# Patient Record
Sex: MALE | Race: WHITE | NOT HISPANIC OR LATINO
[De-identification: names, ages, dates, MRNs, and addresses within clinical notes are randomized per-mention and may not be internally consistent; named-entity substitution may affect disease eponyms.]

---

## 2017-05-08 ENCOUNTER — CHARTING TRANS (OUTPATIENT)
Dept: OTHER | Age: 72
End: 2017-05-08

## 2018-11-03 VITALS
HEART RATE: 82 BPM | HEIGHT: 76 IN | WEIGHT: 315 LBS | SYSTOLIC BLOOD PRESSURE: 140 MMHG | RESPIRATION RATE: 20 BRPM | DIASTOLIC BLOOD PRESSURE: 76 MMHG | BODY MASS INDEX: 38.36 KG/M2

## 2020-07-17 ENCOUNTER — LAB ENCOUNTER (OUTPATIENT)
Dept: LAB | Facility: HOSPITAL | Age: 75
DRG: 469 | End: 2020-07-17
Attending: ORTHOPAEDIC SURGERY
Payer: MEDICARE

## 2020-07-17 ENCOUNTER — LAB ENCOUNTER (OUTPATIENT)
Dept: LAB | Facility: HOSPITAL | Age: 75
End: 2020-07-17
Attending: ORTHOPAEDIC SURGERY
Payer: MEDICARE

## 2020-07-17 DIAGNOSIS — Z01.818 PREOP TESTING: ICD-10-CM

## 2020-07-17 DIAGNOSIS — Z01.818 PRE-OP TESTING: ICD-10-CM

## 2020-07-17 LAB
ANTIBODY SCREEN: NEGATIVE
MRSA DNA SPEC QL NAA+PROBE: NEGATIVE
RH BLOOD TYPE: POSITIVE

## 2020-07-17 PROCEDURE — 86901 BLOOD TYPING SEROLOGIC RH(D): CPT

## 2020-07-17 PROCEDURE — 86850 RBC ANTIBODY SCREEN: CPT

## 2020-07-17 PROCEDURE — 36415 COLL VENOUS BLD VENIPUNCTURE: CPT

## 2020-07-17 PROCEDURE — 86900 BLOOD TYPING SEROLOGIC ABO: CPT

## 2020-07-17 PROCEDURE — 87641 MR-STAPH DNA AMP PROBE: CPT

## 2020-07-17 RX ORDER — HYDROCHLOROTHIAZIDE 12.5 MG/1
12.5 TABLET ORAL DAILY
COMMUNITY

## 2020-07-17 RX ORDER — ALLOPURINOL 300 MG/1
300 TABLET ORAL DAILY
COMMUNITY

## 2020-07-17 RX ORDER — ASPIRIN 81 MG/1
81 TABLET ORAL DAILY
Status: ON HOLD | COMMUNITY
End: 2020-07-22

## 2020-07-17 RX ORDER — LISINOPRIL 20 MG/1
20 TABLET ORAL DAILY
COMMUNITY

## 2020-07-17 RX ORDER — TAMSULOSIN HYDROCHLORIDE 0.4 MG/1
CAPSULE ORAL DAILY
COMMUNITY

## 2020-07-17 RX ORDER — METOPROLOL SUCCINATE 25 MG/1
25 TABLET, EXTENDED RELEASE ORAL DAILY
COMMUNITY

## 2020-07-17 RX ORDER — ATORVASTATIN CALCIUM 40 MG/1
40 TABLET, FILM COATED ORAL DAILY
COMMUNITY

## 2020-07-17 RX ORDER — PREGABALIN 150 MG/1
150 CAPSULE ORAL NIGHTLY
COMMUNITY

## 2020-07-17 RX ORDER — PIOGLITAZONEHYDROCHLORIDE 45 MG/1
45 TABLET ORAL DAILY
COMMUNITY

## 2020-07-18 LAB — SARS-COV-2 RNA RESP QL NAA+PROBE: NOT DETECTED

## 2020-07-20 ENCOUNTER — APPOINTMENT (OUTPATIENT)
Dept: GENERAL RADIOLOGY | Facility: HOSPITAL | Age: 75
DRG: 469 | End: 2020-07-20
Attending: NURSE PRACTITIONER
Payer: MEDICARE

## 2020-07-20 ENCOUNTER — ANESTHESIA (OUTPATIENT)
Dept: SURGERY | Facility: HOSPITAL | Age: 75
DRG: 469 | End: 2020-07-20
Payer: MEDICARE

## 2020-07-20 ENCOUNTER — HOSPITAL ENCOUNTER (INPATIENT)
Facility: HOSPITAL | Age: 75
LOS: 10 days | Discharge: NURSING, REHAB OR LONG-TERM CARE | DRG: 469 | End: 2020-07-30
Attending: ORTHOPAEDIC SURGERY | Admitting: ORTHOPAEDIC SURGERY
Payer: MEDICARE

## 2020-07-20 ENCOUNTER — ANESTHESIA EVENT (OUTPATIENT)
Dept: SURGERY | Facility: HOSPITAL | Age: 75
DRG: 469 | End: 2020-07-20
Payer: MEDICARE

## 2020-07-20 DIAGNOSIS — M16.11 DEGENERATIVE JOINT DISEASE OF RIGHT HIP: ICD-10-CM

## 2020-07-20 DIAGNOSIS — Z01.818 PREOP TESTING: Primary | ICD-10-CM

## 2020-07-20 DIAGNOSIS — M16.11 PRIMARY OSTEOARTHRITIS OF RIGHT HIP: ICD-10-CM

## 2020-07-20 PROBLEM — M10.9 GOUT: Chronic | Status: ACTIVE | Noted: 2020-07-20

## 2020-07-20 PROBLEM — E66.01 MORBID OBESITY WITH BMI OF 40.0-44.9, ADULT (HCC): Chronic | Status: ACTIVE | Noted: 2020-07-20

## 2020-07-20 PROBLEM — E78.5 HYPERLIPIDEMIA: Chronic | Status: ACTIVE | Noted: 2020-07-20

## 2020-07-20 PROBLEM — I10 ESSENTIAL HYPERTENSION: Chronic | Status: ACTIVE | Noted: 2020-07-20

## 2020-07-20 PROBLEM — E11.9 DIABETES MELLITUS, TYPE 2 (HCC): Chronic | Status: ACTIVE | Noted: 2020-07-20

## 2020-07-20 PROBLEM — Z96.641 S/P HIP REPLACEMENT, RIGHT: Status: ACTIVE | Noted: 2020-07-20

## 2020-07-20 LAB
DEPRECATED RDW RBC AUTO: 54.7 FL (ref 35.1–46.3)
ERYTHROCYTE [DISTWIDTH] IN BLOOD BY AUTOMATED COUNT: 15 % (ref 11–15)
EST. AVERAGE GLUCOSE BLD GHB EST-MCNC: 111 MG/DL (ref 68–126)
GLUCOSE BLDC GLUCOMTR-MCNC: 101 MG/DL (ref 70–99)
GLUCOSE BLDC GLUCOMTR-MCNC: 124 MG/DL (ref 70–99)
GLUCOSE BLDC GLUCOMTR-MCNC: 125 MG/DL (ref 70–99)
GLUCOSE BLDC GLUCOMTR-MCNC: 131 MG/DL (ref 70–99)
HBA1C MFR BLD HPLC: 5.5 % (ref ?–5.7)
HCT VFR BLD AUTO: 30.6 % (ref 39–53)
HGB BLD-MCNC: 9.9 G/DL (ref 13–17.5)
MCH RBC QN AUTO: 32 PG (ref 26–34)
MCHC RBC AUTO-ENTMCNC: 32.4 G/DL (ref 31–37)
MCV RBC AUTO: 99 FL (ref 80–100)
PLATELET # BLD AUTO: 138 10(3)UL (ref 150–450)
RBC # BLD AUTO: 3.09 X10(6)UL (ref 3.8–5.8)
WBC # BLD AUTO: 10.4 X10(3) UL (ref 4–11)

## 2020-07-20 PROCEDURE — 99232 SBSQ HOSP IP/OBS MODERATE 35: CPT | Performed by: HOSPITALIST

## 2020-07-20 PROCEDURE — 72170 X-RAY EXAM OF PELVIS: CPT | Performed by: NURSE PRACTITIONER

## 2020-07-20 PROCEDURE — P9045 ALBUMIN (HUMAN), 5%, 250 ML: HCPCS | Performed by: NURSE ANESTHETIST, CERTIFIED REGISTERED

## 2020-07-20 PROCEDURE — 0SR904Z REPLACEMENT OF RIGHT HIP JOINT WITH CERAMIC ON POLYETHYLENE SYNTHETIC SUBSTITUTE, OPEN APPROACH: ICD-10-PCS | Performed by: ORTHOPAEDIC SURGERY

## 2020-07-20 DEVICE — PINNACLE CANCELLOUS BONE SCREW 6.5MM X 30MM
Type: IMPLANTABLE DEVICE | Site: HIP | Status: FUNCTIONAL
Brand: PINNACLE

## 2020-07-20 DEVICE — PINNACLE CANCELLOUS BONE SCREW 6.5MM X 35MM
Type: IMPLANTABLE DEVICE | Site: HIP | Status: FUNCTIONAL
Brand: PINNACLE

## 2020-07-20 DEVICE — CERAMIC HEAD UPCHARGE: Type: IMPLANTABLE DEVICE | Status: FUNCTIONAL

## 2020-07-20 DEVICE — SUMMIT FEMORAL STEM 12/14 TAPER TAPER ED W/POROCOAT SIZE 9 STD 165MM
Type: IMPLANTABLE DEVICE | Site: HIP | Status: FUNCTIONAL
Brand: SUMMIT POROCOAT

## 2020-07-20 DEVICE — PINNACLE HIP SOLUTIONS ALTRX POLYETHYLENE ACETABULAR LINER NEUTRAL 36MM ID 62MM OD
Type: IMPLANTABLE DEVICE | Site: HIP | Status: FUNCTIONAL
Brand: PINNACLE ALTRX

## 2020-07-20 DEVICE — TOTAL HIP W/GRP CUP & COCR HD: Type: IMPLANTABLE DEVICE | Status: FUNCTIONAL

## 2020-07-20 DEVICE — PINNACLE GRIPTION ACETABULAR SHELL MULTI-HOLE 62MM OD
Type: IMPLANTABLE DEVICE | Site: HIP | Status: FUNCTIONAL
Brand: PINNACLE GRIPTION

## 2020-07-20 DEVICE — BIOLOX DELTA CERAMIC FEMORAL HEAD +8.5 36MM DIA 12/14 TAPER
Type: IMPLANTABLE DEVICE | Site: HIP | Status: FUNCTIONAL
Brand: BIOLOX DELTA

## 2020-07-20 RX ORDER — MORPHINE SULFATE 2 MG/ML
1 INJECTION, SOLUTION INTRAMUSCULAR; INTRAVENOUS EVERY 2 HOUR PRN
Status: DISCONTINUED | OUTPATIENT
Start: 2020-07-20 | End: 2020-07-30

## 2020-07-20 RX ORDER — LIDOCAINE HYDROCHLORIDE 10 MG/ML
INJECTION, SOLUTION EPIDURAL; INFILTRATION; INTRACAUDAL; PERINEURAL AS NEEDED
Status: DISCONTINUED | OUTPATIENT
Start: 2020-07-20 | End: 2020-07-20 | Stop reason: SURG

## 2020-07-20 RX ORDER — HYDROCHLOROTHIAZIDE 25 MG/1
12.5 TABLET ORAL DAILY
Status: DISCONTINUED | OUTPATIENT
Start: 2020-07-21 | End: 2020-07-22

## 2020-07-20 RX ORDER — HYDROMORPHONE HYDROCHLORIDE 1 MG/ML
0.6 INJECTION, SOLUTION INTRAMUSCULAR; INTRAVENOUS; SUBCUTANEOUS EVERY 5 MIN PRN
Status: DISCONTINUED | OUTPATIENT
Start: 2020-07-20 | End: 2020-07-20 | Stop reason: HOSPADM

## 2020-07-20 RX ORDER — CYCLOBENZAPRINE HCL 10 MG
10 TABLET ORAL EVERY 8 HOURS PRN
Status: DISCONTINUED | OUTPATIENT
Start: 2020-07-20 | End: 2020-07-30

## 2020-07-20 RX ORDER — POLYETHYLENE GLYCOL 3350 17 G/17G
17 POWDER, FOR SOLUTION ORAL DAILY PRN
Status: DISCONTINUED | OUTPATIENT
Start: 2020-07-20 | End: 2020-07-22

## 2020-07-20 RX ORDER — ONDANSETRON 2 MG/ML
INJECTION INTRAMUSCULAR; INTRAVENOUS AS NEEDED
Status: DISCONTINUED | OUTPATIENT
Start: 2020-07-20 | End: 2020-07-20 | Stop reason: SURG

## 2020-07-20 RX ORDER — DOCUSATE SODIUM 100 MG/1
100 CAPSULE, LIQUID FILLED ORAL 2 TIMES DAILY
Status: DISCONTINUED | OUTPATIENT
Start: 2020-07-20 | End: 2020-07-30

## 2020-07-20 RX ORDER — ACETAMINOPHEN 325 MG/1
650 TABLET ORAL EVERY 4 HOURS PRN
Status: DISCONTINUED | OUTPATIENT
Start: 2020-07-20 | End: 2020-07-30

## 2020-07-20 RX ORDER — METOCLOPRAMIDE 10 MG/1
10 TABLET ORAL ONCE
Status: COMPLETED | OUTPATIENT
Start: 2020-07-20 | End: 2020-07-20

## 2020-07-20 RX ORDER — MORPHINE SULFATE 4 MG/ML
4 INJECTION, SOLUTION INTRAMUSCULAR; INTRAVENOUS EVERY 10 MIN PRN
Status: DISCONTINUED | OUTPATIENT
Start: 2020-07-20 | End: 2020-07-20 | Stop reason: HOSPADM

## 2020-07-20 RX ORDER — METOPROLOL SUCCINATE 25 MG/1
25 TABLET, EXTENDED RELEASE ORAL DAILY
Status: DISCONTINUED | OUTPATIENT
Start: 2020-07-21 | End: 2020-07-30

## 2020-07-20 RX ORDER — SENNOSIDES 8.6 MG
17.2 TABLET ORAL NIGHTLY
Status: DISCONTINUED | OUTPATIENT
Start: 2020-07-20 | End: 2020-07-30

## 2020-07-20 RX ORDER — PREGABALIN 75 MG/1
150 CAPSULE ORAL DAILY
Status: DISCONTINUED | OUTPATIENT
Start: 2020-07-21 | End: 2020-07-20

## 2020-07-20 RX ORDER — HYDROCODONE BITARTRATE AND ACETAMINOPHEN 10; 325 MG/1; MG/1
1 TABLET ORAL EVERY 4 HOURS PRN
Status: DISCONTINUED | OUTPATIENT
Start: 2020-07-20 | End: 2020-07-30

## 2020-07-20 RX ORDER — FAMOTIDINE 10 MG/ML
20 INJECTION, SOLUTION INTRAVENOUS 2 TIMES DAILY
Status: DISCONTINUED | OUTPATIENT
Start: 2020-07-20 | End: 2020-07-22

## 2020-07-20 RX ORDER — HYDROCODONE BITARTRATE AND ACETAMINOPHEN 5; 325 MG/1; MG/1
2 TABLET ORAL AS NEEDED
Status: DISCONTINUED | OUTPATIENT
Start: 2020-07-20 | End: 2020-07-20 | Stop reason: HOSPADM

## 2020-07-20 RX ORDER — SCOLOPAMINE TRANSDERMAL SYSTEM 1 MG/1
1 PATCH, EXTENDED RELEASE TRANSDERMAL ONCE
Status: COMPLETED | OUTPATIENT
Start: 2020-07-20 | End: 2020-07-23

## 2020-07-20 RX ORDER — PROCHLORPERAZINE EDISYLATE 5 MG/ML
10 INJECTION INTRAMUSCULAR; INTRAVENOUS EVERY 6 HOURS PRN
Status: ACTIVE | OUTPATIENT
Start: 2020-07-20 | End: 2020-07-22

## 2020-07-20 RX ORDER — CELECOXIB 200 MG/1
200 CAPSULE ORAL ONCE
Status: COMPLETED | OUTPATIENT
Start: 2020-07-20 | End: 2020-07-20

## 2020-07-20 RX ORDER — SODIUM PHOSPHATE, DIBASIC AND SODIUM PHOSPHATE, MONOBASIC 7; 19 G/133ML; G/133ML
1 ENEMA RECTAL ONCE AS NEEDED
Status: DISCONTINUED | OUTPATIENT
Start: 2020-07-20 | End: 2020-07-30

## 2020-07-20 RX ORDER — PIOGLITAZONEHYDROCHLORIDE 45 MG/1
45 TABLET ORAL DAILY
Status: DISCONTINUED | OUTPATIENT
Start: 2020-07-21 | End: 2020-07-30

## 2020-07-20 RX ORDER — PREGABALIN 75 MG/1
150 CAPSULE ORAL EVERY EVENING
Status: DISCONTINUED | OUTPATIENT
Start: 2020-07-20 | End: 2020-07-30

## 2020-07-20 RX ORDER — METOPROLOL TARTRATE 5 MG/5ML
2.5 INJECTION INTRAVENOUS ONCE
Status: DISCONTINUED | OUTPATIENT
Start: 2020-07-20 | End: 2020-07-20 | Stop reason: HOSPADM

## 2020-07-20 RX ORDER — TRANEXAMIC ACID 10 MG/ML
1000 INJECTION, SOLUTION INTRAVENOUS ONCE
Status: COMPLETED | OUTPATIENT
Start: 2020-07-20 | End: 2020-07-20

## 2020-07-20 RX ORDER — DEXTROSE MONOHYDRATE 25 G/50ML
50 INJECTION, SOLUTION INTRAVENOUS
Status: DISCONTINUED | OUTPATIENT
Start: 2020-07-20 | End: 2020-07-20 | Stop reason: HOSPADM

## 2020-07-20 RX ORDER — ALLOPURINOL 300 MG/1
300 TABLET ORAL DAILY
Status: DISCONTINUED | OUTPATIENT
Start: 2020-07-21 | End: 2020-07-30

## 2020-07-20 RX ORDER — NALOXONE HYDROCHLORIDE 0.4 MG/ML
80 INJECTION, SOLUTION INTRAMUSCULAR; INTRAVENOUS; SUBCUTANEOUS AS NEEDED
Status: DISCONTINUED | OUTPATIENT
Start: 2020-07-20 | End: 2020-07-20 | Stop reason: HOSPADM

## 2020-07-20 RX ORDER — BISACODYL 10 MG
10 SUPPOSITORY, RECTAL RECTAL
Status: DISCONTINUED | OUTPATIENT
Start: 2020-07-20 | End: 2020-07-30

## 2020-07-20 RX ORDER — MORPHINE SULFATE 10 MG/ML
6 INJECTION, SOLUTION INTRAMUSCULAR; INTRAVENOUS EVERY 10 MIN PRN
Status: DISCONTINUED | OUTPATIENT
Start: 2020-07-20 | End: 2020-07-20 | Stop reason: HOSPADM

## 2020-07-20 RX ORDER — HYDROCODONE BITARTRATE AND ACETAMINOPHEN 5; 325 MG/1; MG/1
1 TABLET ORAL AS NEEDED
Status: DISCONTINUED | OUTPATIENT
Start: 2020-07-20 | End: 2020-07-20 | Stop reason: HOSPADM

## 2020-07-20 RX ORDER — SODIUM CHLORIDE, SODIUM LACTATE, POTASSIUM CHLORIDE, CALCIUM CHLORIDE 600; 310; 30; 20 MG/100ML; MG/100ML; MG/100ML; MG/100ML
INJECTION, SOLUTION INTRAVENOUS CONTINUOUS
Status: DISCONTINUED | OUTPATIENT
Start: 2020-07-20 | End: 2020-07-20 | Stop reason: HOSPADM

## 2020-07-20 RX ORDER — ALBUMIN, HUMAN INJ 5% 5 %
SOLUTION INTRAVENOUS CONTINUOUS PRN
Status: DISCONTINUED | OUTPATIENT
Start: 2020-07-20 | End: 2020-07-20 | Stop reason: SURG

## 2020-07-20 RX ORDER — MORPHINE SULFATE 4 MG/ML
4 INJECTION, SOLUTION INTRAMUSCULAR; INTRAVENOUS EVERY 2 HOUR PRN
Status: DISCONTINUED | OUTPATIENT
Start: 2020-07-20 | End: 2020-07-30

## 2020-07-20 RX ORDER — ACETAMINOPHEN 500 MG
1000 TABLET ORAL ONCE
Status: COMPLETED | OUTPATIENT
Start: 2020-07-20 | End: 2020-07-20

## 2020-07-20 RX ORDER — FAMOTIDINE 20 MG/1
20 TABLET ORAL ONCE
Status: COMPLETED | OUTPATIENT
Start: 2020-07-20 | End: 2020-07-20

## 2020-07-20 RX ORDER — KETOROLAC TROMETHAMINE 15 MG/ML
15 INJECTION, SOLUTION INTRAMUSCULAR; INTRAVENOUS EVERY 6 HOURS
Status: DISCONTINUED | OUTPATIENT
Start: 2020-07-20 | End: 2020-07-20 | Stop reason: HOSPADM

## 2020-07-20 RX ORDER — ONDANSETRON 2 MG/ML
4 INJECTION INTRAMUSCULAR; INTRAVENOUS ONCE AS NEEDED
Status: DISCONTINUED | OUTPATIENT
Start: 2020-07-20 | End: 2020-07-20 | Stop reason: HOSPADM

## 2020-07-20 RX ORDER — SODIUM CHLORIDE 9 MG/ML
INJECTION, SOLUTION INTRAVENOUS CONTINUOUS
Status: DISCONTINUED | OUTPATIENT
Start: 2020-07-20 | End: 2020-07-24

## 2020-07-20 RX ORDER — HALOPERIDOL 5 MG/ML
0.25 INJECTION INTRAMUSCULAR ONCE AS NEEDED
Status: DISCONTINUED | OUTPATIENT
Start: 2020-07-20 | End: 2020-07-20 | Stop reason: HOSPADM

## 2020-07-20 RX ORDER — BUPIVACAINE HYDROCHLORIDE AND EPINEPHRINE 2.5; 5 MG/ML; UG/ML
INJECTION, SOLUTION INFILTRATION; PERINEURAL AS NEEDED
Status: DISCONTINUED | OUTPATIENT
Start: 2020-07-20 | End: 2020-07-20 | Stop reason: HOSPADM

## 2020-07-20 RX ORDER — HYDROCODONE BITARTRATE AND ACETAMINOPHEN 10; 325 MG/1; MG/1
2 TABLET ORAL EVERY 4 HOURS PRN
Status: DISCONTINUED | OUTPATIENT
Start: 2020-07-20 | End: 2020-07-30

## 2020-07-20 RX ORDER — GLYCOPYRROLATE 0.2 MG/ML
INJECTION, SOLUTION INTRAMUSCULAR; INTRAVENOUS AS NEEDED
Status: DISCONTINUED | OUTPATIENT
Start: 2020-07-20 | End: 2020-07-20 | Stop reason: SURG

## 2020-07-20 RX ORDER — PROCHLORPERAZINE EDISYLATE 5 MG/ML
5 INJECTION INTRAMUSCULAR; INTRAVENOUS ONCE AS NEEDED
Status: DISCONTINUED | OUTPATIENT
Start: 2020-07-20 | End: 2020-07-20 | Stop reason: HOSPADM

## 2020-07-20 RX ORDER — HYDROCODONE BITARTRATE AND ACETAMINOPHEN 5; 325 MG/1; MG/1
1 TABLET ORAL EVERY 4 HOURS PRN
Status: DISCONTINUED | OUTPATIENT
Start: 2020-07-20 | End: 2020-07-30

## 2020-07-20 RX ORDER — HYDROMORPHONE HYDROCHLORIDE 1 MG/ML
0.2 INJECTION, SOLUTION INTRAMUSCULAR; INTRAVENOUS; SUBCUTANEOUS EVERY 5 MIN PRN
Status: DISCONTINUED | OUTPATIENT
Start: 2020-07-20 | End: 2020-07-20 | Stop reason: HOSPADM

## 2020-07-20 RX ORDER — OXYCODONE HCL 10 MG/1
10 TABLET, FILM COATED, EXTENDED RELEASE ORAL ONCE
Status: COMPLETED | OUTPATIENT
Start: 2020-07-20 | End: 2020-07-20

## 2020-07-20 RX ORDER — HYDROMORPHONE HYDROCHLORIDE 1 MG/ML
0.4 INJECTION, SOLUTION INTRAMUSCULAR; INTRAVENOUS; SUBCUTANEOUS EVERY 5 MIN PRN
Status: DISCONTINUED | OUTPATIENT
Start: 2020-07-20 | End: 2020-07-20 | Stop reason: HOSPADM

## 2020-07-20 RX ORDER — LISINOPRIL 20 MG/1
20 TABLET ORAL DAILY
Status: DISCONTINUED | OUTPATIENT
Start: 2020-07-21 | End: 2020-07-22

## 2020-07-20 RX ORDER — ONDANSETRON 2 MG/ML
4 INJECTION INTRAMUSCULAR; INTRAVENOUS EVERY 4 HOURS PRN
Status: DISCONTINUED | OUTPATIENT
Start: 2020-07-20 | End: 2020-07-30

## 2020-07-20 RX ORDER — MORPHINE SULFATE 2 MG/ML
2 INJECTION, SOLUTION INTRAMUSCULAR; INTRAVENOUS EVERY 2 HOUR PRN
Status: DISCONTINUED | OUTPATIENT
Start: 2020-07-20 | End: 2020-07-30

## 2020-07-20 RX ORDER — FAMOTIDINE 20 MG/1
20 TABLET ORAL 2 TIMES DAILY
Status: DISCONTINUED | OUTPATIENT
Start: 2020-07-20 | End: 2020-07-22

## 2020-07-20 RX ORDER — ATORVASTATIN CALCIUM 40 MG/1
40 TABLET, FILM COATED ORAL NIGHTLY
Status: DISCONTINUED | OUTPATIENT
Start: 2020-07-20 | End: 2020-07-30

## 2020-07-20 RX ORDER — TAMSULOSIN HYDROCHLORIDE 0.4 MG/1
0.4 CAPSULE ORAL DAILY
Status: DISCONTINUED | OUTPATIENT
Start: 2020-07-21 | End: 2020-07-30

## 2020-07-20 RX ORDER — SODIUM CHLORIDE, SODIUM LACTATE, POTASSIUM CHLORIDE, CALCIUM CHLORIDE 600; 310; 30; 20 MG/100ML; MG/100ML; MG/100ML; MG/100ML
INJECTION, SOLUTION INTRAVENOUS CONTINUOUS
Status: DISCONTINUED | OUTPATIENT
Start: 2020-07-20 | End: 2020-07-23

## 2020-07-20 RX ORDER — MORPHINE SULFATE 4 MG/ML
2 INJECTION, SOLUTION INTRAMUSCULAR; INTRAVENOUS EVERY 10 MIN PRN
Status: DISCONTINUED | OUTPATIENT
Start: 2020-07-20 | End: 2020-07-20 | Stop reason: HOSPADM

## 2020-07-20 RX ORDER — DEXTROSE MONOHYDRATE 25 G/50ML
50 INJECTION, SOLUTION INTRAVENOUS
Status: DISCONTINUED | OUTPATIENT
Start: 2020-07-20 | End: 2020-07-30

## 2020-07-20 RX ORDER — METOCLOPRAMIDE HYDROCHLORIDE 5 MG/ML
10 INJECTION INTRAMUSCULAR; INTRAVENOUS EVERY 6 HOURS PRN
Status: ACTIVE | OUTPATIENT
Start: 2020-07-20 | End: 2020-07-22

## 2020-07-20 RX ADMIN — ONDANSETRON 4 MG: 2 INJECTION INTRAMUSCULAR; INTRAVENOUS at 12:15:00

## 2020-07-20 RX ADMIN — GLYCOPYRROLATE 0.2 MG: 0.2 INJECTION, SOLUTION INTRAMUSCULAR; INTRAVENOUS at 13:00:00

## 2020-07-20 RX ADMIN — SODIUM CHLORIDE, SODIUM LACTATE, POTASSIUM CHLORIDE, CALCIUM CHLORIDE: 600; 310; 30; 20 INJECTION, SOLUTION INTRAVENOUS at 14:23:00

## 2020-07-20 RX ADMIN — ALBUMIN, HUMAN INJ 5%: 5 SOLUTION INTRAVENOUS at 13:48:00

## 2020-07-20 RX ADMIN — LIDOCAINE HYDROCHLORIDE 50 MG: 10 INJECTION, SOLUTION EPIDURAL; INFILTRATION; INTRACAUDAL; PERINEURAL at 12:15:00

## 2020-07-20 RX ADMIN — TRANEXAMIC ACID 1000 MG: 10 INJECTION, SOLUTION INTRAVENOUS at 12:39:00

## 2020-07-20 RX ADMIN — SODIUM CHLORIDE, SODIUM LACTATE, POTASSIUM CHLORIDE, CALCIUM CHLORIDE: 600; 310; 30; 20 INJECTION, SOLUTION INTRAVENOUS at 13:49:00

## 2020-07-20 NOTE — ANESTHESIA PROCEDURE NOTES
Airway  Date/Time: 7/20/2020 12:33 PM  Urgency: Elective    Airway not difficult    General Information and Staff    Patient location during procedure: OR  Anesthesiologist: Nora Grajeda MD  Resident/CRNA: My Novak CRNA  Performed: Allison Becerril

## 2020-07-20 NOTE — ANESTHESIA POSTPROCEDURE EVALUATION
Patient: Nonnie Handing    Procedure Summary     Date:  07/20/20 Room / Location:  [facility] OR [address] [place] [address] OR    Anesthesia Start:  6714 Anesthesia Stop:  9333    Procedure:  HIP TOTAL REPLACEMENT (Right Hip) Diagnosis:  (degenerative joint di

## 2020-07-20 NOTE — ANESTHESIA PREPROCEDURE EVALUATION
Anesthesia PreOp Note    HPI:     David Chavez is a 69year old male who presents for preoperative consultation requested by: Treasure Nevarez MD    Date of Surgery: 7/20/2020    Procedure(s):  HIP TOTAL REPLACEMENT  Indication: degenerative j 0600  pregabalin 150 MG Oral Cap, Take 150 mg by mouth daily. , Disp: , Rfl: , 7/20/2020 at 0600  tamsulosin (FLOMAX) cap, Take by mouth daily. , Disp: , Rfl: , Past Month at Unknown time  Oxaprozin (DAYPRO OR), Take 1 tablet by mouth daily. , Disp: , Rfl: , file    Occupational History      Not on file    Social Needs      Financial resource strain: Not on file      Food insecurity:        Worry: Not on file        Inability: Not on file      Transportation needs:        Medical: Not on file        Non-medica amputee, uses wheel chair, medical clearance in the chart   Abdominal   (+) obese,              Anesthesia Plan:   ASA:  3  Plan:   General  Airway:  ETT  Plan Comments: Due to previous back surgeries discussed general with the patient.  Also discussed the

## 2020-07-20 NOTE — PROGRESS NOTES
[facility]OSP - [facility]    Progress Note    Denny Abraham Patient Status:  Inpatient    10/20/1945 MRN [id number]   Location [address] UNIT Attending Vlad Harvey MD   Hosp Day # 0 PCP Karla Rocha MD Morbid obesity with BMI of 40.0-44.9, adult ([facility])  MONITOR RESPIRATORY AND HEMODYNAMIC STATUS, POSSIBLE UNDIAGNOSED LELO. Hyperlipidemia  CONT HOME MEDS. Gout  CONT HOME MEDS.        Diabetes mellitus, type 2 ([facility] [address]  CONT HOME MEDS, MONITOR ACCU

## 2020-07-20 NOTE — ANESTHESIA PROCEDURE NOTES
Peripheral IV  Date/Time: 7/20/2020 12:19 PM  Inserted by: Kory Chen CRNA    Placement  Needle size: 18 G  Laterality: right  Location: hand  Local anesthetic: none  Site prep: alcohol  Technique: anatomical landmarks  Attempts: 1

## 2020-07-20 NOTE — H&P
History & Physical Examination    Patient Name: Deisy Roman  MRN: [id number]  [facility]: [id number]  YOB: 1945    Diagnosis: right hip DJD    Present Illness: Mr. Gary Barr is a 70 YO male with a  Hx of DJD of the right hip who presen (REGLAN) tab 10 mg, 10 mg, Oral, Once  celecoxib (CeleBREX) cap 200 mg, 200 mg, Oral, Once  oxyCODONE HCl ER (OXYCONTIN) 12 hr tab 10 mg, 10 mg, Oral, Once  scopolamine (TRANSDERM-SCOP) patch, 1 patch, Transdermal, Once  tranexamic acid (CYKLOKAPRON) IVPB

## 2020-07-20 NOTE — OPERATIVE REPORT
[facility]    [place] Brief Operative Note    Nonnie Handing Patient Status:  Inpatient    10/20/1945 MRN [id number]   Location [facility] UNIT Attending Carmen Mena MD     PCP Nithya Sibley MD

## 2020-07-21 LAB
BASOPHILS # BLD AUTO: 0.02 X10(3) UL (ref 0–0.2)
BASOPHILS NFR BLD AUTO: 0.2 %
DEPRECATED RDW RBC AUTO: 54.7 FL (ref 35.1–46.3)
EOSINOPHIL # BLD AUTO: 0.05 X10(3) UL (ref 0–0.7)
EOSINOPHIL NFR BLD AUTO: 0.6 %
ERYTHROCYTE [DISTWIDTH] IN BLOOD BY AUTOMATED COUNT: 15.3 % (ref 11–15)
GLUCOSE BLDC GLUCOMTR-MCNC: 110 MG/DL (ref 70–99)
GLUCOSE BLDC GLUCOMTR-MCNC: 132 MG/DL (ref 70–99)
GLUCOSE BLDC GLUCOMTR-MCNC: 137 MG/DL (ref 70–99)
GLUCOSE BLDC GLUCOMTR-MCNC: 148 MG/DL (ref 70–99)
HCT VFR BLD AUTO: 27.3 % (ref 39–53)
HGB BLD-MCNC: 8.8 G/DL (ref 13–17.5)
IMM GRANULOCYTES # BLD AUTO: 0.03 X10(3) UL (ref 0–1)
IMM GRANULOCYTES NFR BLD: 0.4 %
LYMPHOCYTES # BLD AUTO: 1.82 X10(3) UL (ref 1–4)
LYMPHOCYTES NFR BLD AUTO: 21.3 %
MCH RBC QN AUTO: 31.9 PG (ref 26–34)
MCHC RBC AUTO-ENTMCNC: 32.2 G/DL (ref 31–37)
MCV RBC AUTO: 98.9 FL (ref 80–100)
MONOCYTES # BLD AUTO: 1.03 X10(3) UL (ref 0.1–1)
MONOCYTES NFR BLD AUTO: 12.1 %
NEUTROPHILS # BLD AUTO: 5.58 X10 (3) UL (ref 1.5–7.7)
NEUTROPHILS # BLD AUTO: 5.58 X10(3) UL (ref 1.5–7.7)
NEUTROPHILS NFR BLD AUTO: 65.4 %
PLATELET # BLD AUTO: 141 10(3)UL (ref 150–450)
RBC # BLD AUTO: 2.76 X10(6)UL (ref 3.8–5.8)
WBC # BLD AUTO: 8.5 X10(3) UL (ref 4–11)

## 2020-07-21 PROCEDURE — 99233 SBSQ HOSP IP/OBS HIGH 50: CPT | Performed by: HOSPITALIST

## 2020-07-21 RX ORDER — OXYCODONE HCL 20 MG/1
20 TABLET, FILM COATED, EXTENDED RELEASE ORAL EVERY 12 HOURS
Status: DISCONTINUED | OUTPATIENT
Start: 2020-07-21 | End: 2020-07-24

## 2020-07-21 RX ORDER — HYDROMORPHONE HYDROCHLORIDE 2 MG/1
2 TABLET ORAL
Status: DISCONTINUED | OUTPATIENT
Start: 2020-07-21 | End: 2020-07-30

## 2020-07-21 NOTE — CM/SW NOTE
7/29 350pm; MAUREEN received notification by Mazin Valle the pt's insurance has denied acute rehab. There is an option for a peer to peer from an Dolly Perdomo MD. MAUREEN spoke with Dr. Chanelle Mcdowell who is agreeable to the peer to peer.   RED to schedule the peer to peer for frances The pt's wife would like a referral to [facility]. Referral sent in Aidin. Will follow up on response. If they are able to accept that would be the pt. And his wife's first choice. [facility] does not accept the pt's insurance.   MAUREEN notif at [facility].  Referral made in [address]. The pt. And his wife are aware and agreeable to the above.         [phone]

## 2020-07-21 NOTE — RESPIRATORY THERAPY NOTE
Patient refused cpap therapy. [facility] has educated the patient on the purpose of and need for this therapy as well as potential negative outcomes associated with deferring treatment.  Despite education, patient maintains refusal. Pt & family states,he doesn't u

## 2020-07-21 NOTE — PLAN OF CARE
Pt states pain is slightly controlled with oral and iv pain meds. Pt remains max assist in all cares and is reinforced to call for all needs. Pt is drinking well and ospina is draining well. Pt current vss. Pt has fall precautions and ortho hip in place.  Rn

## 2020-07-21 NOTE — OPERATIVE REPORT
[address]  [address] [address] Hermes JAY    OPERATIVE REPORT    PATIENT NAME: Toan Perrin  MR#: [id number]    DATE OF PROCEDURE: 7/20/2020  PREOPERATIVE DIAGNOSIS: Degenerative Arthritis (e.g., OA) of the Right hip ability to walk. This has negatively affected his ability to perform the basic activities of daily living. Based upon his radiographs, that showed severe degenerative joint disease of the right hip, he was indicated for a right total hip arthroplasty.  We r dissected sharply down to the level of the deep fascia. The deep fascia was identified and incised in line with our incision and our Charnley retractor was placed deep to this layer.     We then identified the external rotators on the posterior aspect of th the medullary canal and a lateralizing reamer proximally.  We then successfully broached up to a size after using tapered reamers up to a size 9 and noted that we had excellent fit and fill proximally within the femur and good rotational and axial stability tolerated. He will work with physical therapy. He will be on deep venous thrombosis prophylaxis for the next three weeks and graciela-operative antibiotics for the next 24 hours. Based on medical history and extent of the surgery, the patient will be admitted t

## 2020-07-21 NOTE — OCCUPATIONAL THERAPY NOTE
OCCUPATIONAL THERAPY EVALUATION - INPATIENT      Room Number: 403/403-A  Evaluation Date: 7/21/2020  Type of Evaluation: Initial       Physician Order: IP Consult to Occupational Therapy  Reason for Therapy: ADL/IADL Dysfunction and Discharge Planning    O x2 to reposition to [facility]- patient left in bed with all needs in reach stable at exit.        DISCHARGE RECOMMENDATIONS:   OT Discharge Recommendations: Acute rehabilitation  OT Device Recommendations: TBD    PLAN          OCCUPATIONAL THERAPY MEDICAL/SOCIAL ASSESSMENT  Ratin       RANGE OF MOTION   Upper extremity ROM is 1/2-3/4 range at shoulders; strength is sufficient for basic ADL such as UE grooming, dressing, bathing, and self feeding, however will need to be addressed as it is not enough to provide

## 2020-07-21 NOTE — PHYSICAL THERAPY NOTE
PHYSICAL THERAPY HIP EVALUATION - INPATIENT     Room Number: 403/403-A  Evaluation Date: 7/21/2020  Type of Evaluation: Initial  Physician Order: PT Eval and Treat    Presenting Problem: R THR (L BKA X20 yrs ago)  Reason for Therapy: Mobility Dysfunction a raised. Pt able to clear bed a few inches to allow for linen change underneath him. Pt needing Max A of 2-3 for sit->supine and to boost up/reposition in bed. Pt unable to tolerate laying flat for more than a few seconds due to severe low back pain.  Yaris Laterality Date   • APPENDECTOMY     • HIP TOTAL REPLACEMENT Right 7/20/2020    Performed by Felicia Betts MD at [address] [place] [address]   • OTHER      BACK SURGERY X12   • OTHER Left     BELOW KNEE AMPUTEE   • SPINE SURGERY PROCEDURE UNLISTED      x 12 surgeries Lot   -   Sitting down on and standing up from a chair with arms (e.g., wheelchair, bedside commode, etc.): Unable   -   Moving from lying on back to sitting on the side of the bed?: A Lot   How much help from another person does the patient currently need Goal #6  Current Status

## 2020-07-21 NOTE — PROGRESS NOTES
[facility]    Progress Note    Klarissa Felipe Patient Status:  Inpatient    10/20/1945 MRN [id number]   Location [facility] 4W/SW/SE Attending José Miguel Calixto MD   Hosp Day # 1 PCP Arin Goodrich MD       Subjective:   R Xr Pelvis (1 View) (cpt=72170)    Result Date: 7/20/2020  CONCLUSION:  1. Status post right hip total arthroplasty.     Dictated by (CST): Cathy Palma MD on 7/20/2020 at 4:29 PM     Finalized by (CST): Franklin Jimenez MD on 7/20/20

## 2020-07-21 NOTE — PROGRESS NOTES
[facility]    Progress Note    Lety Delarosa Patient Status:  Inpatient    10/20/1945 MRN [id number]   Location [facility] 4W/SW/SE Attending Balaji Fletcher, [address] [facility] Day # 1 PCP Tesfaye Louis MD       Subjective: 3350 (MIRALAX) powder packet 17 g, 17 g, Oral, Daily PRN  magnesium hydroxide (MILK OF MAGNESIA) 400 MG/5ML suspension 30 mL, 30 mL, Oral, Daily PRN  bisacodyl (DULCOLAX) rectal suppository 10 mg, 10 mg, Rectal, Daily PRN  Fleet Enema (FLEET) 7-19 GM/118ML (cpt=72170)    Result Date: 7/20/2020  CONCLUSION:  1. Status post right hip total arthroplasty.     Dictated by (CST): Jerson Jimenez MD on 7/20/2020 at 4:29 PM     Finalized by (CST): Jerson Jimenez MD on 7/20/2020 at 4:30 PM

## 2020-07-22 ENCOUNTER — APPOINTMENT (OUTPATIENT)
Dept: ULTRASOUND IMAGING | Facility: HOSPITAL | Age: 75
DRG: 469 | End: 2020-07-22
Attending: HOSPITALIST
Payer: MEDICARE

## 2020-07-22 LAB
ANION GAP SERPL CALC-SCNC: 6 MMOL/L (ref 0–18)
ANION GAP SERPL CALC-SCNC: 6 MMOL/L (ref 0–18)
BASOPHILS # BLD AUTO: 0.02 X10(3) UL (ref 0–0.2)
BASOPHILS NFR BLD AUTO: 0.2 %
BILIRUB UR QL: NEGATIVE
BUN BLD-MCNC: 32 MG/DL (ref 7–18)
BUN BLD-MCNC: 39 MG/DL (ref 7–18)
BUN/CREAT SERPL: 13.4 (ref 10–20)
BUN/CREAT SERPL: 17.8 (ref 10–20)
CALCIUM BLD-MCNC: 7.5 MG/DL (ref 8.5–10.1)
CALCIUM BLD-MCNC: 7.6 MG/DL (ref 8.5–10.1)
CHLORIDE SERPL-SCNC: 107 MMOL/L (ref 98–112)
CHLORIDE SERPL-SCNC: 107 MMOL/L (ref 98–112)
CO2 SERPL-SCNC: 23 MMOL/L (ref 21–32)
CO2 SERPL-SCNC: 24 MMOL/L (ref 21–32)
COLOR UR: YELLOW
CREAT BLD-MCNC: 2.19 MG/DL (ref 0.7–1.3)
CREAT BLD-MCNC: 2.38 MG/DL (ref 0.7–1.3)
CREAT UR-SCNC: 172 MG/DL
CREAT UR-SCNC: 172 MG/DL
DEPRECATED RDW RBC AUTO: 54.4 FL (ref 35.1–46.3)
EOSINOPHIL # BLD AUTO: 0.13 X10(3) UL (ref 0–0.7)
EOSINOPHIL NFR BLD AUTO: 1.5 %
ERYTHROCYTE [DISTWIDTH] IN BLOOD BY AUTOMATED COUNT: 15.1 % (ref 11–15)
GLUCOSE BLD-MCNC: 112 MG/DL (ref 70–99)
GLUCOSE BLD-MCNC: 129 MG/DL (ref 70–99)
GLUCOSE BLDC GLUCOMTR-MCNC: 132 MG/DL (ref 70–99)
GLUCOSE BLDC GLUCOMTR-MCNC: 134 MG/DL (ref 70–99)
GLUCOSE BLDC GLUCOMTR-MCNC: 145 MG/DL (ref 70–99)
GLUCOSE UR-MCNC: NEGATIVE MG/DL
HCT VFR BLD AUTO: 23.8 % (ref 39–53)
HGB BLD-MCNC: 7.8 G/DL (ref 13–17.5)
HGB UR QL STRIP.AUTO: NEGATIVE
IMM GRANULOCYTES # BLD AUTO: 0.04 X10(3) UL (ref 0–1)
IMM GRANULOCYTES NFR BLD: 0.5 %
IRON SATURATION: 11 % (ref 20–50)
IRON SERPL-MCNC: 25 UG/DL (ref 65–175)
KETONES UR-MCNC: NEGATIVE MG/DL
LEUKOCYTE ESTERASE UR QL STRIP.AUTO: NEGATIVE
LYMPHOCYTES # BLD AUTO: 1.78 X10(3) UL (ref 1–4)
LYMPHOCYTES NFR BLD AUTO: 21.1 %
MCH RBC QN AUTO: 32.4 PG (ref 26–34)
MCHC RBC AUTO-ENTMCNC: 32.8 G/DL (ref 31–37)
MCV RBC AUTO: 98.8 FL (ref 80–100)
MICROALBUMIN UR-MCNC: 0.75 MG/DL
MICROALBUMIN/CREAT 24H UR-RTO: 4.4 UG/MG (ref ?–30)
MONOCYTES # BLD AUTO: 1.07 X10(3) UL (ref 0.1–1)
MONOCYTES NFR BLD AUTO: 12.7 %
NEUTROPHILS # BLD AUTO: 5.41 X10 (3) UL (ref 1.5–7.7)
NEUTROPHILS # BLD AUTO: 5.41 X10(3) UL (ref 1.5–7.7)
NEUTROPHILS NFR BLD AUTO: 64 %
NITRITE UR QL STRIP.AUTO: NEGATIVE
OSMOLALITY SERPL CALC.SUM OF ELEC: 292 MOSM/KG (ref 275–295)
OSMOLALITY SERPL CALC.SUM OF ELEC: 293 MOSM/KG (ref 275–295)
PH UR: 5 [PH] (ref 5–8)
PLATELET # BLD AUTO: 136 10(3)UL (ref 150–450)
POTASSIUM SERPL-SCNC: 4.1 MMOL/L (ref 3.5–5.1)
POTASSIUM SERPL-SCNC: 4.7 MMOL/L (ref 3.5–5.1)
PROT UR-MCNC: NEGATIVE MG/DL
RBC # BLD AUTO: 2.41 X10(6)UL (ref 3.8–5.8)
SODIUM SERPL-SCNC: 136 MMOL/L (ref 136–145)
SODIUM SERPL-SCNC: 137 MMOL/L (ref 136–145)
SODIUM SERPL-SCNC: 49 MMOL/L
SP GR UR STRIP: 1.02 (ref 1–1.03)
TOTAL IRON BINDING CAPACITY: 222 UG/DL (ref 240–450)
TRANSFERRIN SERPL-MCNC: 149 MG/DL (ref 200–360)
UROBILINOGEN UR STRIP-ACNC: <2
WBC # BLD AUTO: 8.5 X10(3) UL (ref 4–11)

## 2020-07-22 PROCEDURE — 99233 SBSQ HOSP IP/OBS HIGH 50: CPT | Performed by: HOSPITALIST

## 2020-07-22 PROCEDURE — 76775 US EXAM ABDO BACK WALL LIM: CPT | Performed by: HOSPITALIST

## 2020-07-22 PROCEDURE — 99223 1ST HOSP IP/OBS HIGH 75: CPT | Performed by: INTERNAL MEDICINE

## 2020-07-22 RX ORDER — POLYETHYLENE GLYCOL 3350 17 G/17G
17 POWDER, FOR SOLUTION ORAL DAILY
Status: DISCONTINUED | OUTPATIENT
Start: 2020-07-22 | End: 2020-07-30

## 2020-07-22 RX ORDER — FAMOTIDINE 20 MG/1
20 TABLET ORAL DAILY
Status: DISCONTINUED | OUTPATIENT
Start: 2020-07-23 | End: 2020-07-30

## 2020-07-22 RX ORDER — FAMOTIDINE 10 MG/ML
20 INJECTION, SOLUTION INTRAVENOUS DAILY
Status: DISCONTINUED | OUTPATIENT
Start: 2020-07-23 | End: 2020-07-30

## 2020-07-22 RX ORDER — CYCLOBENZAPRINE HCL 10 MG
10 TABLET ORAL EVERY 8 HOURS PRN
Qty: 20 TABLET | Refills: 0 | Status: ON HOLD | OUTPATIENT
Start: 2020-07-22 | End: 2020-08-28

## 2020-07-22 RX ORDER — HYDROCODONE BITARTRATE AND ACETAMINOPHEN 10; 325 MG/1; MG/1
2 TABLET ORAL EVERY 4 HOURS PRN
Qty: 60 TABLET | Refills: 0 | Status: SHIPPED | OUTPATIENT
Start: 2020-07-22 | End: 2020-08-21

## 2020-07-22 RX ORDER — MELATONIN
325
Status: DISCONTINUED | OUTPATIENT
Start: 2020-07-22 | End: 2020-07-30

## 2020-07-22 RX ORDER — DOXEPIN HYDROCHLORIDE 50 MG/1
1 CAPSULE ORAL DAILY
Status: DISCONTINUED | OUTPATIENT
Start: 2020-07-22 | End: 2020-07-30

## 2020-07-22 RX ORDER — CEPHALEXIN 500 MG/1
500 CAPSULE ORAL EVERY 6 HOURS SCHEDULED
Status: DISCONTINUED | OUTPATIENT
Start: 2020-07-22 | End: 2020-07-22

## 2020-07-22 RX ORDER — CEPHALEXIN 500 MG/1
500 CAPSULE ORAL EVERY 8 HOURS SCHEDULED
Status: DISCONTINUED | OUTPATIENT
Start: 2020-07-22 | End: 2020-07-27

## 2020-07-22 RX ORDER — HYDROMORPHONE HYDROCHLORIDE 2 MG/1
2 TABLET ORAL
Qty: 30 TABLET | Refills: 0 | Status: SHIPPED | OUTPATIENT
Start: 2020-07-22 | End: 2020-08-21

## 2020-07-22 RX ORDER — OXYCODONE HCL 20 MG/1
20 TABLET, FILM COATED, EXTENDED RELEASE ORAL EVERY 12 HOURS
Qty: 30 TABLET | Refills: 0 | Status: SHIPPED | OUTPATIENT
Start: 2020-07-22 | End: 2020-07-29

## 2020-07-22 RX ORDER — LACTULOSE 10 G/15ML
10 SOLUTION ORAL EVERY 6 HOURS PRN
Status: DISCONTINUED | OUTPATIENT
Start: 2020-07-22 | End: 2020-07-30

## 2020-07-22 NOTE — PHYSICAL THERAPY NOTE
PHYSICAL THERAPY TREATMENT NOTE - INPATIENT     Room Number: 403/403-A       Presenting Problem: R THR (L BKA X20 yrs ago)    Problem List  Principal Problem:    Primary osteoarthritis of right hip  Active Problems:    Essential hypertension    Morbid obes Tolerated       PAIN ASSESSMENT   Rating: Unable to rate  Location: R hip and back   Management Techniques: Activity promotion; Body mechanics;Repositioning    BALANCE assistance level: moderate assist    Goal #3   Current Status Not tested    Goal #4     Goal #4   Current Status     Goal #5 Patient verbalizes and/or demonstrates all precautions and safety concerns independently   Goal #5   Current Status In progress   G

## 2020-07-22 NOTE — PLAN OF CARE
Tele tech called and stated, \"Patient had 8.30 seconds of PSVT with rate in the 150s and is now back to Sinus Rhythm 80 with some PACs. Patient had a brief episode yesterday, but not this long. \"  Patient is asymptomatic and resting.   Writer relayed Jae Lan

## 2020-07-22 NOTE — PHYSICAL THERAPY NOTE
PHYSICAL THERAPY HIP TREATMENT NOTE - INPATIENT    Room Number: 403/403-A            Presenting Problem: R THR (L BKA X20 yrs ago)    Problem List  Principal Problem:    Primary osteoarthritis of right hip  Active Problems:    Essential hypertension    Mor the [facility] '6 clicks' Inpatient Basic Mobility Short Form. Research supports that patients with this level of impairment may benefit from [facility], however patient would benefit from rehab facility.     DISCHARGE RECOMMENDATIONS  PT Discharge Recommendations: A Patient's self-stated goal is: to walk better   Goal #1 Patient is able to demonstrate supine - sit EOB @ level: min assist   Goal #1   Current Status Max A x 3   Goal #2 Patient is able to demonstrate transfers Sit to/from Stand at assistance level: moder

## 2020-07-22 NOTE — PLAN OF CARE
Dr Shelbi Alejo on unit at 0700. Removed hemovac. Updated on temp 101.2 oral. Stated to push IS, no new orders. Instructed patient about being diligent about IS every hour x10. RN observed patient to complete IS x10.   Text paged Dr Martha Roberts with update on resu able to discharge from the hospital to home or rehab. Interventions:  - PT, OT, pain meds, non-pharmacologic pain control, education on pain meds.    - See additional Care Plan goals for specific interventions  Outcome: Progressing  Goal: Patient/Family Progressing  Goal: Maintain proper alignment of affected body part  Description  INTERVENTIONS:  - Support and protect limb and body alignment per provider's orders  - Instruct and reinforce with patient and family use of appropriate assistive device and p Consider OT/PT consult to assist with strengthening/mobility  - Encourage toileting schedule  Outcome: Progressing     Problem: DISCHARGE PLANNING  Goal: Discharge to home or other facility with appropriate resources  Description  INTERVENTIONS:  - Identif

## 2020-07-22 NOTE — PROGRESS NOTES
[facility] Pharmacy Note:  Renal Adjustment for cephalexin [facility])    Calixto Lanza is a 69year old patient who has been prescribed cephalexin (KEFLEX) 500 mg every 6 hrs.   CrCl is estimated creatinine clearance is 36.9 mL/min (A) (based on SCr of 2

## 2020-07-22 NOTE — PROGRESS NOTES
[place] [facility] - [facility]    Progress Note    Usama Sharp Patient Status:  Inpatient    10/20/1945 MRN [id number]   Location [facility] 4W/SW/SE Attending Kanchan Bunch Day # 2 PCP MD Ashu Jaime LELO.        Hyperlipidemia  CONT HOME MEDS.         Gout  CONT HOME MEDS.         Diabetes mellitus, type 2 ([facility] [address]  CONT HOME MEDS, MONITOR ACCU CHECKS.    - well controlled      Anemia normocytic   - iron levels   - cbc am     Constipated declines enema has b

## 2020-07-22 NOTE — PROGRESS NOTES
[facility] Pharmacy Note:  Renal Dose Adjustment    Lorenzo Otto has been prescribed famotidine (PEPCID) 20 mg intravenously every 12 hours. Estimated Creatinine Clearance: 36.9 mL/min (A) (based on SCr of 2.19 mg/dL (H)).     Calculated creatinine

## 2020-07-22 NOTE — OCCUPATIONAL THERAPY NOTE
OCCUPATIONAL THERAPY TREATMENT NOTE - INPATIENT    Room Number: 403/403-A               Problem List  Principal Problem:    Primary osteoarthritis of right hip  Active Problems:    Essential hypertension    Morbid obesity with BMI of 40.0-44.9, adult ([facility] Restriction: R lower extremity        R Lower Extremity: Weight Bearing as Tolerated       PAIN ASSESSMENT  Rating: 10  Location: RLE  Management Techniques:  Activity promotion;Relaxation;Repositioning       ACTIVITIES OF DAILY LIVING ASSESSMENT  AM-PAC ‘4

## 2020-07-22 NOTE — CONSULTS
PHYSICAL MEDICINE AND REHABILITATION CONSULTATION       Location [facility] 4W/SW/SE Attending Carolyne Hernandez,*   Hosp Day # 2 PCP Serge Quiroga MD     Patient Identification  Arnel Multani is a 69year old male.   :  [birth year] tolerated ~15-20 minutes EOB; fatigued with this activity and could not tolerate standing at this time; patient returned back to supine with Max A x3; Max A x2 to reposition to [facility]- patient left in bed with all needs in reach stable at exit.      ROS:  Posi mg, Oral, Q4H PRN    Or  HYDROcodone-acetaminophen (NORCO)  MG per tab 1 tablet, 1 tablet, Oral, Q4H PRN    Or  HYDROcodone-acetaminophen (NORCO)  MG per tab 2 tablet, 2 tablet, Oral, Q4H PRN  morphINE sulfate (PF) 2 MG/ML injection 1 mg, 1 mg, chewable tab 8 tablet, 8 tablet, Oral, Q15 Min PRN  Insulin Aspart Pen (NOVOLOG) 100 UNIT/ML flexpen 1-11 Units, 1-11 Units, Subcutaneous, TID CC  pregabalin (LYRICA) cap 150 mg, 150 mg, Oral, QPM        Allergies:  No Known Allergies    Past Medical Histo Ht 76\"   Wt (!) 358 lb 4.8 oz (162.5 kg)   SpO2 95%   BMI 43.61 kg/m²   General: well nourished, NAD, morbidly obese, drowsy, sleeping in cardiac chair  Eyes: conjunctiva and lids intact, pupils are equal and round  ENMT: external inspection of ears and all of her questions to the best of my ability. Encouraged her to work with SW to find a facility that would best support their needs. Reflective listening provided to address her frustration and concerns. Advanced directives reviewed and in chart.

## 2020-07-22 NOTE — PROGRESS NOTES
[place] [facility] - [facility]    Progress Note    Nonnie Handing Patient Status:  Inpatient    10/20/1945 MRN [id number]   Location [facility] 4W/SW/SE Attending Vishal Bajwa, [address] [facility] Day # 2 PCP Nithya Sibley MD       Subjective: .0 (L) 07/22/2020       Xr Pelvis (1 View) (cpt=72170)    Result Date: 7/20/2020  CONCLUSION:  1. Status post right hip total arthroplasty.     Dictated by (CST): Frantz Lane MD on 7/20/2020 at 4:29 PM     Finalized by (CST): Bri Nolan

## 2020-07-23 LAB
ALBUMIN SERPL-MCNC: 2.2 G/DL (ref 3.4–5)
ALBUMIN/GLOB SERPL: 0.6 {RATIO} (ref 1–2)
ALP LIVER SERPL-CCNC: 80 U/L (ref 45–117)
ALT SERPL-CCNC: 26 U/L (ref 16–61)
ANION GAP SERPL CALC-SCNC: 6 MMOL/L (ref 0–18)
AST SERPL-CCNC: 61 U/L (ref 15–37)
BASOPHILS # BLD AUTO: 0.02 X10(3) UL (ref 0–0.2)
BASOPHILS NFR BLD AUTO: 0.2 %
BILIRUB SERPL-MCNC: 0.5 MG/DL (ref 0.1–2)
BUN BLD-MCNC: 56 MG/DL (ref 7–18)
BUN/CREAT SERPL: 22 (ref 10–20)
CALCIUM BLD-MCNC: 7.3 MG/DL (ref 8.5–10.1)
CHLORIDE SERPL-SCNC: 107 MMOL/L (ref 98–112)
CO2 SERPL-SCNC: 24 MMOL/L (ref 21–32)
CREAT BLD-MCNC: 2.55 MG/DL (ref 0.7–1.3)
DEPRECATED RDW RBC AUTO: 53.5 FL (ref 35.1–46.3)
EOSINOPHIL # BLD AUTO: 0.17 X10(3) UL (ref 0–0.7)
EOSINOPHIL NFR BLD AUTO: 1.7 %
ERYTHROCYTE [DISTWIDTH] IN BLOOD BY AUTOMATED COUNT: 15 % (ref 11–15)
GLOBULIN PLAS-MCNC: 3.7 G/DL (ref 2.8–4.4)
GLUCOSE BLD-MCNC: 127 MG/DL (ref 70–99)
GLUCOSE BLDC GLUCOMTR-MCNC: 139 MG/DL (ref 70–99)
GLUCOSE BLDC GLUCOMTR-MCNC: 152 MG/DL (ref 70–99)
GLUCOSE BLDC GLUCOMTR-MCNC: 157 MG/DL (ref 70–99)
GLUCOSE BLDC GLUCOMTR-MCNC: 181 MG/DL (ref 70–99)
HAV IGM SER QL: 1.6 MG/DL (ref 1.6–2.6)
HCT VFR BLD AUTO: 23.3 % (ref 39–53)
HGB BLD-MCNC: 7.6 G/DL (ref 13–17.5)
IMM GRANULOCYTES # BLD AUTO: 0.08 X10(3) UL (ref 0–1)
IMM GRANULOCYTES NFR BLD: 0.8 %
LYMPHOCYTES # BLD AUTO: 1.58 X10(3) UL (ref 1–4)
LYMPHOCYTES NFR BLD AUTO: 16 %
M PROTEIN MFR SERPL ELPH: 5.9 G/DL (ref 6.4–8.2)
MCH RBC QN AUTO: 32.1 PG (ref 26–34)
MCHC RBC AUTO-ENTMCNC: 32.6 G/DL (ref 31–37)
MCV RBC AUTO: 98.3 FL (ref 80–100)
MONOCYTES # BLD AUTO: 1.18 X10(3) UL (ref 0.1–1)
MONOCYTES NFR BLD AUTO: 11.9 %
NEUTROPHILS # BLD AUTO: 6.87 X10 (3) UL (ref 1.5–7.7)
NEUTROPHILS # BLD AUTO: 6.87 X10(3) UL (ref 1.5–7.7)
NEUTROPHILS NFR BLD AUTO: 69.4 %
OSMOLALITY SERPL CALC.SUM OF ELEC: 301 MOSM/KG (ref 275–295)
PHOSPHATE SERPL-MCNC: 3.7 MG/DL (ref 2.5–4.9)
PLATELET # BLD AUTO: 134 10(3)UL (ref 150–450)
POTASSIUM SERPL-SCNC: 4.4 MMOL/L (ref 3.5–5.1)
RBC # BLD AUTO: 2.37 X10(6)UL (ref 3.8–5.8)
SODIUM SERPL-SCNC: 137 MMOL/L (ref 136–145)
WBC # BLD AUTO: 9.9 X10(3) UL (ref 4–11)

## 2020-07-23 PROCEDURE — 99233 SBSQ HOSP IP/OBS HIGH 50: CPT | Performed by: HOSPITALIST

## 2020-07-23 PROCEDURE — 99233 SBSQ HOSP IP/OBS HIGH 50: CPT | Performed by: INTERNAL MEDICINE

## 2020-07-23 RX ORDER — MAGNESIUM SULFATE HEPTAHYDRATE 40 MG/ML
2 INJECTION, SOLUTION INTRAVENOUS ONCE
Status: COMPLETED | OUTPATIENT
Start: 2020-07-23 | End: 2020-07-23

## 2020-07-23 NOTE — PROGRESS NOTES
[place] [facility] - [facility]    Progress Note    Nonnie Handing Patient Status:  Inpatient    10/20/1945 MRN [id number]   Location [facility] 4W/SW/SE Attending Kanchan Bunch Day # 3 PCP MD Yifan Shane (L) 07/22/2020    .0 (L) 07/22/2020    CREATSERUM 2.38 (H) 07/22/2020    BUN 32 (H) 07/22/2020     07/22/2020    K 4.1 07/22/2020     07/22/2020    CO2 24.0 07/22/2020     (H) 07/22/2020    CA 7.5 (L) 07/22/2020       No results f

## 2020-07-23 NOTE — PLAN OF CARE
Carlos is AOX3 but does become confused at times but can be reoriented. He has oral medication for pain control. He has wnl blood sugars. Levin in place. IV replaced twice. Received Venofer.  Unable to get daily weight this morning due to trapeze and bed exte unsuccessful or patient reports new pain  - Anticipate increased pain with activity and pre-medicate as appropriate  Outcome: Progressing     Problem: SAFETY ADULT - FALL  Goal: Free from fall injury  Description  INTERVENTIONS:  - Assess pt frequently for

## 2020-07-23 NOTE — PHYSICAL THERAPY NOTE
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 Research supports that patients with this level of impairment may benefit from LTAC. PT recommendation: ACUTE REHAB to maximize level of independence prior to return home.       DISCHARGE RECOMMENDATION (G-Code): CM    FUNCTIONAL ABILITY STATUS  Gait Assessment   Gait Assistance: Not tested  Distance (ft): unable to stand  Stoop/Curb Assistance: Not tested     Patient End of Session: Up in chair;Needs met;Call light within reach;RN aware of session/findin

## 2020-07-23 NOTE — PROGRESS NOTES
[facility]    Progress Note    [facility] Patient Status:  Inpatient    10/20/1945 MRN [id number]   Location [facility] 4W/SW/SE Attending Kanchan Bunch Day # 3 PCP MD Shivam Morales normal  Skin/Hair: no unusual rashes present, no abnormal bruising noted  Back/Spine: no abnormalities noted  Musculoskeletal: full ROM all extremities good strength  no deformities  Extremities: 1+ edema  Neurological:  Grossly normal    Results:     Labo

## 2020-07-23 NOTE — CONSULTS
[facility]    PATIENT'S NAME: Iglesia Mena   ATTENDING PHYSICIAN: Fe Bunch MD   CONSULTING PHYSICIAN: Tasia Lanza MD   PATIENT ACCOUNT#:   [id number]    LOCATION:  [address] [place] [address] #:   [id number] shortness of breath. He has chronic constipation. He states he has not urinated all day long. Had a Levin catheter postoperatively which was subsequently discontinued. He states he is eating and drinking fair.   A 10-point review of systems is otherwise below-knee amputation. 6.   Multiple back surgeries. 7.   Obesity. 8.   Hypercholesterolemia. 9.   Gout. 10.   Anemia, iron deficient. 11.   Fevers. PLAN:  Will place a Levin catheter. Currently on Flomax.   Check urinalysis along with a random ur

## 2020-07-23 NOTE — OCCUPATIONAL THERAPY NOTE
OCCUPATIONAL THERAPY TREATMENT NOTE - INPATIENT    Room Number: 403/403-A               Problem List  Principal Problem:    Primary osteoarthritis of right hip  Active Problems:    Essential hypertension    Morbid obesity with BMI of 40.0-44.9, adult ([facility] AND GLOVES      DISCHARGE RECOMMENDATIONS  OT Discharge Recommendations: Sub-acute rehabilitation  OT Device Recommendations: TBD    PLAN  OT Treatment Plan: Balance activities; ADL training;Functional transfer training; Endurance training;Patient/Family edu with lift at this time     Patient will complete self care task at sink level with Min A    Comment: NT    Patient will independently recall  hip precautions  Comment: ongoing         Goals  on:20  Frequency: 3-5x week    Chaz Bartlett, HERNÁNR/L

## 2020-07-23 NOTE — PROGRESS NOTES
[facility]    Progress Note    David Chavez Patient Status:  Inpatient    10/20/1945 MRN [id number]   Location [facility] 4W/SW/SE Attending Kanchan Bunch Day # 3 PCP MD Brianna Munson LELO.        Hyperlipidemia  CONT HOME MEDS.         Gout  CONT HOME MEDS.         Diabetes mellitus, type 2 ([facility] [address]  CONT HOME MEDS, MONITOR ACCU CHECKS.    - well controlled      Anemia normocytic   - iron levels   - cbc am     Constipated declines enema has b

## 2020-07-23 NOTE — PLAN OF CARE
Pt. Tmax 101.2 this AM. Hospitalist aware, encouraged IS. Temp down to 100.3 post IS & Norco. Ultrasound kidneys/bladder done. Nephrology on consult for increasing creatinine. Levin placed per Dr. Desire Frederick orders for urinary retention.  1400 ml liza urine o integrity  - Assess and document dressing/incision, wound bed, drain sites and surrounding tissue  - Implement wound care per orders  - Initiate isolation precautions as appropriate  - Initiate Pressure Ulcer prevention bundle as indicated  Outcome: Rachel management  - Manage/alleviate anxiety  - Utilize distraction and/or relaxation techniques  - Monitor for opioid side effects  - Notify MD/LIP if interventions unsuccessful or patient reports new pain  - Anticipate increased pain with activity and pre-medi

## 2020-07-24 ENCOUNTER — APPOINTMENT (OUTPATIENT)
Dept: CV DIAGNOSTICS | Facility: HOSPITAL | Age: 75
DRG: 469 | End: 2020-07-24
Attending: HOSPITALIST
Payer: MEDICARE

## 2020-07-24 LAB
ANION GAP SERPL CALC-SCNC: 8 MMOL/L (ref 0–18)
ANION GAP SERPL CALC-SCNC: 8 MMOL/L (ref 0–18)
BASOPHILS # BLD AUTO: 0.02 X10(3) UL (ref 0–0.2)
BASOPHILS NFR BLD AUTO: 0.2 %
BUN BLD-MCNC: 55 MG/DL (ref 7–18)
BUN BLD-MCNC: 83 MG/DL (ref 7–18)
BUN/CREAT SERPL: 18.7 (ref 10–20)
BUN/CREAT SERPL: 29.4 (ref 10–20)
CALCIUM BLD-MCNC: 7.6 MG/DL (ref 8.5–10.1)
CALCIUM BLD-MCNC: 7.6 MG/DL (ref 8.5–10.1)
CHLORIDE SERPL-SCNC: 104 MMOL/L (ref 98–112)
CHLORIDE SERPL-SCNC: 106 MMOL/L (ref 98–112)
CO2 SERPL-SCNC: 22 MMOL/L (ref 21–32)
CO2 SERPL-SCNC: 23 MMOL/L (ref 21–32)
CREAT BLD-MCNC: 2.82 MG/DL (ref 0.7–1.3)
CREAT BLD-MCNC: 2.94 MG/DL (ref 0.7–1.3)
DEPRECATED HBV CORE AB SER IA-ACNC: 471.8 NG/ML (ref 30–530)
DEPRECATED RDW RBC AUTO: 51 FL (ref 35.1–46.3)
EOSINOPHIL # BLD AUTO: 0.42 X10(3) UL (ref 0–0.7)
EOSINOPHIL NFR BLD AUTO: 4.3 %
ERYTHROCYTE [DISTWIDTH] IN BLOOD BY AUTOMATED COUNT: 14.6 % (ref 11–15)
GLUCOSE BLD-MCNC: 137 MG/DL (ref 70–99)
GLUCOSE BLD-MCNC: 141 MG/DL (ref 70–99)
GLUCOSE BLDC GLUCOMTR-MCNC: 139 MG/DL (ref 70–99)
GLUCOSE BLDC GLUCOMTR-MCNC: 172 MG/DL (ref 70–99)
GLUCOSE BLDC GLUCOMTR-MCNC: 183 MG/DL (ref 70–99)
HAV IGM SER QL: 2 MG/DL (ref 1.6–2.6)
HAV IGM SER QL: 2.2 MG/DL (ref 1.6–2.6)
HCT VFR BLD AUTO: 21.2 % (ref 39–53)
HGB BLD-MCNC: 7.1 G/DL (ref 13–17.5)
HGB BLD-MCNC: 7.5 G/DL (ref 13–17.5)
IMM GRANULOCYTES # BLD AUTO: 0.12 X10(3) UL (ref 0–1)
IMM GRANULOCYTES NFR BLD: 1.2 %
IRON SATURATION: 32 % (ref 20–50)
IRON SERPL-MCNC: 63 UG/DL (ref 65–175)
LYMPHOCYTES # BLD AUTO: 1.71 X10(3) UL (ref 1–4)
LYMPHOCYTES NFR BLD AUTO: 17.4 %
MCH RBC QN AUTO: 32.4 PG (ref 26–34)
MCHC RBC AUTO-ENTMCNC: 33.5 G/DL (ref 31–37)
MCV RBC AUTO: 96.8 FL (ref 80–100)
MONOCYTES # BLD AUTO: 1.18 X10(3) UL (ref 0.1–1)
MONOCYTES NFR BLD AUTO: 12 %
NEUTROPHILS # BLD AUTO: 6.37 X10 (3) UL (ref 1.5–7.7)
NEUTROPHILS # BLD AUTO: 6.37 X10(3) UL (ref 1.5–7.7)
NEUTROPHILS NFR BLD AUTO: 64.9 %
OSMOLALITY SERPL CALC.SUM OF ELEC: 299 MOSM/KG (ref 275–295)
OSMOLALITY SERPL CALC.SUM OF ELEC: 307 MOSM/KG (ref 275–295)
PHOSPHATE SERPL-MCNC: 5.2 MG/DL (ref 2.5–4.9)
PLATELET # BLD AUTO: 165 10(3)UL (ref 150–450)
POTASSIUM SERPL-SCNC: 4.3 MMOL/L (ref 3.5–5.1)
POTASSIUM SERPL-SCNC: 4.5 MMOL/L (ref 3.5–5.1)
POTASSIUM SERPL-SCNC: 4.7 MMOL/L (ref 3.5–5.1)
RBC # BLD AUTO: 2.19 X10(6)UL (ref 3.8–5.8)
SODIUM SERPL-SCNC: 135 MMOL/L (ref 136–145)
SODIUM SERPL-SCNC: 136 MMOL/L (ref 136–145)
TOTAL IRON BINDING CAPACITY: 198 UG/DL (ref 240–450)
TRANSFERRIN SERPL-MCNC: 133 MG/DL (ref 200–360)
TROPONIN I SERPL-MCNC: <0.045 NG/ML (ref ?–0.04)
WBC # BLD AUTO: 9.8 X10(3) UL (ref 4–11)

## 2020-07-24 PROCEDURE — 99233 SBSQ HOSP IP/OBS HIGH 50: CPT | Performed by: HOSPITALIST

## 2020-07-24 PROCEDURE — 99233 SBSQ HOSP IP/OBS HIGH 50: CPT | Performed by: INTERNAL MEDICINE

## 2020-07-24 PROCEDURE — 93308 TTE F-UP OR LMTD: CPT | Performed by: HOSPITALIST

## 2020-07-24 RX ORDER — ASPIRIN 325 MG
325 TABLET ORAL 2 TIMES DAILY
Qty: 45 TABLET | Refills: 0 | Status: SHIPPED | OUTPATIENT
Start: 2020-07-24

## 2020-07-24 RX ORDER — OXYCODONE HCL 10 MG/1
10 TABLET, FILM COATED, EXTENDED RELEASE ORAL EVERY 12 HOURS
Status: DISCONTINUED | OUTPATIENT
Start: 2020-07-24 | End: 2020-07-29

## 2020-07-24 RX ORDER — ASPIRIN 325 MG
325 TABLET ORAL 2 TIMES DAILY
Status: DISCONTINUED | OUTPATIENT
Start: 2020-07-24 | End: 2020-07-30

## 2020-07-24 NOTE — PROGRESS NOTES
Patient had 8 beats of nonsustained V tach and was asymptomatic   Review of Care everywhere reveals that prt saw cardiology prior to his hip surgery and a Cardiac cath was done late in May of this year it revealed.  30% stenosis of  prox LAD  30% prox circu

## 2020-07-24 NOTE — PHYSICAL THERAPY NOTE
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 \"     OBJECTIVE  Precautions: Hip abduction pillow;OR[place]O - posterior;Limb alert - right;Orthotic/prosthesis(LLE prosthesis; has a back brace and knee brace from home) Position Sitting       Patient End of Session: Up in chair;Needs met;Call light within reach;RN aware of session/findings; All patient questions and concerns addressed; With [facility] staff    CURRENT GOALS   Goals to be met by: 7/28/2020  Patient Goal Patient's

## 2020-07-24 NOTE — PROGRESS NOTES
[facility]    Progress Note    Candancealeksandr Corona Patient Status:  Inpatient    10/20/1945 MRN [id number]   Location [facility] 4W/SW/SE Attending Kanchan Bunch # 4 PCP MD Brandon Light 07/23/2020    CREATSERUM 2.55 (H) 07/23/2020    BUN 56 (H) 07/23/2020     07/23/2020    K 4.7 07/24/2020     07/23/2020    CO2 24.0 07/23/2020     (H) 07/23/2020    CA 7.3 (L) 07/23/2020    ALB 2.2 (L) 07/23/2020    ALKPHO 80 07/23/2020

## 2020-07-24 NOTE — OCCUPATIONAL THERAPY NOTE
OCCUPATIONAL THERAPY TREATMENT NOTE - INPATIENT    Room Number: 403/403-A               Problem List  Principal Problem:    Primary osteoarthritis of right hip  Active Problems:    Essential hypertension    Morbid obesity with BMI of 40.0-44.9, adult ([facility] rehabilitation  OT Device Recommendations: TBD    PLAN  OT Treatment Plan: Balance activities; ADL training;Functional transfer training; Endurance training;Patient/Family education;Patient/Family training; Compensatory technique education    FELICIANO Romero Patient will complete LE dressing with Min A   Comment: max a     Patient will complete toilet transfer with Min A   Comment:  Total A with lift at this time     Patient will complete self care task at sink level with Min A    Comment: unable    Patient

## 2020-07-24 NOTE — PROGRESS NOTES
[facility]  Nephrology Daily Progress Note    Aaliyah Henson  [id number]  69year old      HPI:   Aaliyah Henson is a 69year old male. Up in chair. Pain fair. C/O constipation. Drinking fluids well.       ROS: 21.2 07/24/2020    .0 07/24/2020    CREATSERUM 2.94 07/24/2020    BUN 55 07/24/2020     07/24/2020    K 4.5 07/24/2020     07/24/2020    CO2 22.0 07/24/2020     07/24/2020    CA 7.6 07/24/2020    MG 2.2 07/24/2020    PHOS 5.2 07/2 MG/ML injection 1 mg, 1 mg, Intravenous, Q2H PRN **OR** morphINE sulfate (PF) 2 MG/ML injection 2 mg, 2 mg, Intravenous, Q2H PRN **OR** morphINE sulfate (PF) 4 MG/ML injection 4 mg, 4 mg, Intravenous, Q2H PRN  •  Senna (SENOKOT) tab 17.2 mg, 17.2 mg, Oral, hypertension     Morbid obesity with BMI of 40.0-44.9, adult ([facility]     Hyperlipidemia     Gout     Diabetes mellitus, type 2 ([facility]     S/P hip replacement, right     ROBBIN (acute kidney injury) ([facility]      UO decent. 1100 cc.   Creatinine still creeping up to

## 2020-07-24 NOTE — PROGRESS NOTES
[place] [facility]    Progress Note    Suresh Akhtar Patient Status:  Inpatient    10/20/1945 MRN [id number]   Location [facility] 4W/SW/SE Attending Kanchan Bunch Day # 4 PCP MD Nessa Vega LELO.        Hyperlipidemia  CONT HOME MEDS.         Gout  CONT HOME MEDS.         Diabetes mellitus, type 2 ([facility]  CONT HOME MEDS, MONITOR ACCU CHECKS.    - well controlled      Anemia normocytic   - iron levels mildly low   - cbc am     Constipated agrees to

## 2020-07-24 NOTE — PLAN OF CARE
Patient is oriented x4. Wife at bedside during visiting  hours. [facility] accucheck. [facility] [employer]. Patient is a 2 max transfer. Scds and teds in place. Dressing to right hip d/c/I. Patient refused suppository at HS. Will attempt to administer in a.m.  Patient had 8 beat

## 2020-07-25 LAB
ALBUMIN SERPL-MCNC: 2 G/DL (ref 3.4–5)
ALBUMIN/GLOB SERPL: 0.5 {RATIO} (ref 1–2)
ALP LIVER SERPL-CCNC: 179 U/L (ref 45–117)
ALT SERPL-CCNC: 54 U/L (ref 16–61)
ANION GAP SERPL CALC-SCNC: 6 MMOL/L (ref 0–18)
ANTIBODY SCREEN: NEGATIVE
AST SERPL-CCNC: 129 U/L (ref 15–37)
BASOPHILS # BLD AUTO: 0.02 X10(3) UL (ref 0–0.2)
BASOPHILS NFR BLD AUTO: 0.2 %
BILIRUB SERPL-MCNC: 0.6 MG/DL (ref 0.1–2)
BUN BLD-MCNC: 96 MG/DL (ref 7–18)
BUN/CREAT SERPL: 37.5 (ref 10–20)
CALCIUM BLD-MCNC: 7.5 MG/DL (ref 8.5–10.1)
CHLORIDE SERPL-SCNC: 106 MMOL/L (ref 98–112)
CO2 SERPL-SCNC: 24 MMOL/L (ref 21–32)
CREAT BLD-MCNC: 2.56 MG/DL (ref 0.7–1.3)
DEPRECATED RDW RBC AUTO: 51.1 FL (ref 35.1–46.3)
EOSINOPHIL # BLD AUTO: 0.26 X10(3) UL (ref 0–0.7)
EOSINOPHIL NFR BLD AUTO: 3.1 %
ERYTHROCYTE [DISTWIDTH] IN BLOOD BY AUTOMATED COUNT: 14.7 % (ref 11–15)
GLOBULIN PLAS-MCNC: 3.8 G/DL (ref 2.8–4.4)
GLUCOSE BLD-MCNC: 155 MG/DL (ref 70–99)
GLUCOSE BLDC GLUCOMTR-MCNC: 118 MG/DL (ref 70–99)
GLUCOSE BLDC GLUCOMTR-MCNC: 136 MG/DL (ref 70–99)
GLUCOSE BLDC GLUCOMTR-MCNC: 175 MG/DL (ref 70–99)
GLUCOSE BLDC GLUCOMTR-MCNC: 178 MG/DL (ref 70–99)
HAV IGM SER QL: 2.3 MG/DL (ref 1.6–2.6)
HCT VFR BLD AUTO: 20 % (ref 39–53)
HCT VFR BLD AUTO: 25.2 % (ref 39–53)
HGB BLD-MCNC: 6.6 G/DL (ref 13–17.5)
HGB BLD-MCNC: 8.4 G/DL (ref 13–17.5)
IMM GRANULOCYTES # BLD AUTO: 0.1 X10(3) UL (ref 0–1)
IMM GRANULOCYTES NFR BLD: 1.2 %
LYMPHOCYTES # BLD AUTO: 1.08 X10(3) UL (ref 1–4)
LYMPHOCYTES NFR BLD AUTO: 12.8 %
M PROTEIN MFR SERPL ELPH: 5.8 G/DL (ref 6.4–8.2)
MCH RBC QN AUTO: 31.9 PG (ref 26–34)
MCHC RBC AUTO-ENTMCNC: 33 G/DL (ref 31–37)
MCV RBC AUTO: 96.6 FL (ref 80–100)
MONOCYTES # BLD AUTO: 1 X10(3) UL (ref 0.1–1)
MONOCYTES NFR BLD AUTO: 11.8 %
NEUTROPHILS # BLD AUTO: 5.98 X10 (3) UL (ref 1.5–7.7)
NEUTROPHILS # BLD AUTO: 5.98 X10(3) UL (ref 1.5–7.7)
NEUTROPHILS NFR BLD AUTO: 70.9 %
OSMOLALITY SERPL CALC.SUM OF ELEC: 315 MOSM/KG (ref 275–295)
PHOSPHATE SERPL-MCNC: 4.9 MG/DL (ref 2.5–4.9)
PLATELET # BLD AUTO: 182 10(3)UL (ref 150–450)
POTASSIUM SERPL-SCNC: 4.7 MMOL/L (ref 3.5–5.1)
RBC # BLD AUTO: 2.07 X10(6)UL (ref 3.8–5.8)
RH BLOOD TYPE: POSITIVE
SODIUM SERPL-SCNC: 136 MMOL/L (ref 136–145)
WBC # BLD AUTO: 8.4 X10(3) UL (ref 4–11)

## 2020-07-25 PROCEDURE — 99222 1ST HOSP IP/OBS MODERATE 55: CPT | Performed by: INTERNAL MEDICINE

## 2020-07-25 PROCEDURE — 99233 SBSQ HOSP IP/OBS HIGH 50: CPT | Performed by: INTERNAL MEDICINE

## 2020-07-25 PROCEDURE — 30233N1 TRANSFUSION OF NONAUTOLOGOUS RED BLOOD CELLS INTO PERIPHERAL VEIN, PERCUTANEOUS APPROACH: ICD-10-PCS | Performed by: HOSPITALIST

## 2020-07-25 PROCEDURE — 99233 SBSQ HOSP IP/OBS HIGH 50: CPT | Performed by: HOSPITALIST

## 2020-07-25 RX ORDER — ACETAMINOPHEN 325 MG/1
650 TABLET ORAL ONCE
Status: COMPLETED | OUTPATIENT
Start: 2020-07-25 | End: 2020-07-25

## 2020-07-25 RX ORDER — SODIUM CHLORIDE 9 MG/ML
INJECTION, SOLUTION INTRAVENOUS ONCE
Status: COMPLETED | OUTPATIENT
Start: 2020-07-25 | End: 2020-07-25

## 2020-07-25 NOTE — PHYSICAL THERAPY NOTE
Attempted PT Rx at scheduled time, however RN requests to hold PT this a.m. due to Hbg low at 6.6. Will recheck for appropriateness this p.m. Thank you.

## 2020-07-25 NOTE — PROGRESS NOTES
[place] [facility] - [facility]    Progress Note    Kuldeep Martinez Patient Status:  Inpatient    10/20/1945 MRN [id number]   Location [facility] 4W/SW/SE Attending Kanchan Bunch Day # 5 PCP MD Lorie Barreto normal  Skin/Hair: no unusual rashes present, no abnormal bruising noted  Back/Spine: no abnormalities noted  Musculoskeletal: full ROM all extremities good strength  no deformities  Extremities: 1+ edema  Neurological:  Grossly normal    Results:     Labo

## 2020-07-25 NOTE — PLAN OF CARE
Pt remains with slight confusion, but is able to be reoriented. Pt remains max assist in all cares. Pt states pain is well controlled with po pain meds. Pt is drinking well and ospina is draining well. Pt current vss.  Rn encouraged pt to call for all needs, skin integrity  - Monitor for areas of redness and/or skin breakdown  - Initiate interventions, skin care algorithm/standards of care as needed  Outcome: Progressing  Goal: Incision(s), wounds(s) or drain site(s) healing without S/S of infection  Descripti patient's stated pain goal  Description  INTERVENTIONS:  - Encourage pt to monitor pain and request assistance  - Assess pain using appropriate pain scale  - Administer analgesics based on type and severity of pain and evaluate response  - Implement non-ph to Case Management Department for coordinating discharge planning if the patient needs post-hospital services based on physician/LIP order or complex needs related to functional status, cognitive ability or social support system  Outcome: Progressing

## 2020-07-25 NOTE — PHYSICAL THERAPY NOTE
Attempted PT Rx at scheduled time for this p.m., however pt still receiving blood transfusion and RN, Margi Campbell states it should be done in 1 1/2 hours. Will follow this p.m. Thank you.

## 2020-07-25 NOTE — PROGRESS NOTES
[place] [facility] - [facility]    Progress Note    Suresh Akhtar Patient Status:  Inpatient    10/20/1945 MRN [id number]   Location [facility] 4W/SW/SE Attending Kanchan Bunch Day # 5 PCP MD Nessa Vega LELO.        Hyperlipidemia  CONT HOME MEDS.         Gout  CONT HOME MEDS.         Diabetes mellitus, type 2 ([facility]  CONT HOME MEDS, MONITOR ACCU CHECKS.    - well controlled      Anemia of post op blood loss transfused today  - iron levels mildly low   - cbc a

## 2020-07-25 NOTE — CONSULTS
[facility]    Report of Consultation    Elle Davis Patient Status:  Inpatient    10/20/1945 MRN [id number]   Location [facility] 4W/SW/SE Attending Kanchan Bunch # 5 PCP Paramjit Gordon MD     Da vitals reviewed. Constitutional: He is oriented to person, place, and time. Cardiovascular: Normal rate and regular rhythm. Edema not present.   Pulmonary/Chest: Effort normal and breath sounds normal.   Neurological: He is alert and oriented to per -baseline aprox 1.5   -per pcp       Plan  Continue bb, replace lytes prn  Will have office call for event monitor to evaluate for asymptomatic pAF    Will sign off, please call back with questions      [facility] AT [facility], 07/25/20, 4:34 PM  INTERVENTIONAL CARDI

## 2020-07-25 NOTE — PLAN OF CARE
Problem: Patient Centered Care  Goal: Patient preferences are identified and integrated in the patient's plan of care  Description  Interventions:  - What would you like us to know as we care for you?  Patient is hard of hearing and normally wears a heari splint when sleeping at night.   - Monitor incision for any signs of infection.  - Pain management with oral medication.  - See additional Care Plan goals for specific interventions  Outcome: Progressing     Problem: SKIN/TISSUE INTEGRITY - ADULT  Goal: Ski Functional Mobility  Goal: Achieve highest/safest level of mobility/gait  Description  Interventions:  - Assess patient's functional ability and stability  - Promote increasing activity/tolerance for mobility and gait  - Educate and engage patient/family i patient/family/discharge partner in discharge planning  - Arrange for needed discharge resources and transportation as appropriate  - Identify discharge learning needs (meds, wound care, etc)  - Arrange for interpreters to assist at discharge as needed  -

## 2020-07-26 LAB
ANION GAP SERPL CALC-SCNC: 6 MMOL/L (ref 0–18)
BASOPHILS # BLD AUTO: 0.02 X10(3) UL (ref 0–0.2)
BASOPHILS NFR BLD AUTO: 0.2 %
BLOOD TYPE BARCODE: 6200
BUN BLD-MCNC: 77 MG/DL (ref 7–18)
BUN/CREAT SERPL: 41.2 (ref 10–20)
CALCIUM BLD-MCNC: 7.7 MG/DL (ref 8.5–10.1)
CHLORIDE SERPL-SCNC: 110 MMOL/L (ref 98–112)
CO2 SERPL-SCNC: 22 MMOL/L (ref 21–32)
CREAT BLD-MCNC: 1.87 MG/DL (ref 0.7–1.3)
DEPRECATED RDW RBC AUTO: 52.3 FL (ref 35.1–46.3)
EOSINOPHIL # BLD AUTO: 0.64 X10(3) UL (ref 0–0.7)
EOSINOPHIL NFR BLD AUTO: 7.3 %
ERYTHROCYTE [DISTWIDTH] IN BLOOD BY AUTOMATED COUNT: 15.1 % (ref 11–15)
GLUCOSE BLD-MCNC: 131 MG/DL (ref 70–99)
GLUCOSE BLDC GLUCOMTR-MCNC: 117 MG/DL (ref 70–99)
GLUCOSE BLDC GLUCOMTR-MCNC: 123 MG/DL (ref 70–99)
GLUCOSE BLDC GLUCOMTR-MCNC: 131 MG/DL (ref 70–99)
GLUCOSE BLDC GLUCOMTR-MCNC: 137 MG/DL (ref 70–99)
HAV IGM SER QL: 2.4 MG/DL (ref 1.6–2.6)
HCT VFR BLD AUTO: 22.3 % (ref 39–53)
HGB BLD-MCNC: 7.4 G/DL (ref 13–17.5)
HGB BLD-MCNC: 8.6 G/DL (ref 13–17.5)
IMM GRANULOCYTES # BLD AUTO: 0.15 X10(3) UL (ref 0–1)
IMM GRANULOCYTES NFR BLD: 1.7 %
LYMPHOCYTES # BLD AUTO: 1.17 X10(3) UL (ref 1–4)
LYMPHOCYTES NFR BLD AUTO: 13.3 %
MCH RBC QN AUTO: 31.9 PG (ref 26–34)
MCHC RBC AUTO-ENTMCNC: 33.2 G/DL (ref 31–37)
MCV RBC AUTO: 96.1 FL (ref 80–100)
MONOCYTES # BLD AUTO: 1.2 X10(3) UL (ref 0.1–1)
MONOCYTES NFR BLD AUTO: 13.7 %
NEUTROPHILS # BLD AUTO: 5.6 X10 (3) UL (ref 1.5–7.7)
NEUTROPHILS # BLD AUTO: 5.6 X10(3) UL (ref 1.5–7.7)
NEUTROPHILS NFR BLD AUTO: 63.8 %
OSMOLALITY SERPL CALC.SUM OF ELEC: 311 MOSM/KG (ref 275–295)
PLATELET # BLD AUTO: 195 10(3)UL (ref 150–450)
POTASSIUM SERPL-SCNC: 4.7 MMOL/L (ref 3.5–5.1)
RBC # BLD AUTO: 2.32 X10(6)UL (ref 3.8–5.8)
SODIUM SERPL-SCNC: 138 MMOL/L (ref 136–145)
WBC # BLD AUTO: 8.8 X10(3) UL (ref 4–11)

## 2020-07-26 PROCEDURE — 99233 SBSQ HOSP IP/OBS HIGH 50: CPT | Performed by: HOSPITALIST

## 2020-07-26 PROCEDURE — 99233 SBSQ HOSP IP/OBS HIGH 50: CPT | Performed by: INTERNAL MEDICINE

## 2020-07-26 RX ORDER — CASTOR OIL AND BALSAM, PERU 788; 87 MG/G; MG/G
OINTMENT TOPICAL 2 TIMES DAILY
Status: DISCONTINUED | OUTPATIENT
Start: 2020-07-26 | End: 2020-07-30

## 2020-07-26 NOTE — PHYSICAL THERAPY NOTE
PHYSICAL THERAPY TREATMENT NOTE - INPATIENT     Room Number: 403/403-A       Presenting Problem: R THR (L BKA X20 yrs ago)    Problem List  Principal Problem:    Primary osteoarthritis of right hip  Active Problems:    Essential hypertension    Morbid obes has a back brace and knee brace from home)    WEIGHT BEARING RESTRICTION  Weight Bearing Restriction: R lower extremity        R Lower Extremity: Weight Bearing as Tolerated       PAIN ASSESSMENT   Rating: 10  Location: right hip  Management Techniques:  Ac Current Status Max/total assist x 3   Goal #2 Patient is able to demonstrate transfers Sit to/from Stand at assistance level: moderate assist      Goal #2  Current Status NT   Goal #3 Patient is able to ambulate 10 feet or more with assistive device at a

## 2020-07-26 NOTE — PROGRESS NOTES
[facility]    Progress Note    Pamela Ortiz Patient Status:  Inpatient    10/20/1945 MRN [id number]   Location [facility] 4W/SW/SE Attending Kanchan Bunch Day # 6 PCP MD Jamil Mtz abnormalities noted  Musculoskeletal: full ROM all extremities good strength  no deformities  Extremities 1+ edema  Neurological:  Grossly normal    Results:     Laboratory Data:  Lab Results   Component Value Date    WBC 8.8 07/26/2020    HGB 8.6 (L) 07/2

## 2020-07-26 NOTE — PROGRESS NOTES
[facility]    Progress Note    Angelique Schofield Patient Status:  Inpatient    10/20/1945 MRN [id number]   Location [facility] 4W/SW/SE Attending Kanchan Bunch # 6 PCP MD Leona Perez LELO.        Hyperlipidemia  CONT HOME MEDS.         Gout  CONT HOME MEDS.         Diabetes mellitus, type 2 ([facility]  CONT HOME MEDS, MONITOR ACCU CHECKS.    - well controlled      Anemia of post op blood loss transfused today  - iron levels mildly low   - cbc a

## 2020-07-26 NOTE — PLAN OF CARE
Pt remains with slight confusion, but is able to be reoriented. Pt remains max assist in all cares. Lift used for position changes. Pt states pain is well controlled with po pain meds. Pt is drinking well and ospina is draining well. Pt current vss.  Rn cam ordered. - Posterior hip precaution. Maintain hip abductor splint when sleeping at night.   - Monitor incision for any signs of infection.  - Pain management with oral medication.  - See additional Care Plan goals for specific interventions  Outcome: Progr consults as needed  - Advance activity as appropriate  - Communicate ordered activity level and limitations with patient/family  Outcome: Progressing  Goal: Maintain proper alignment of affected body part  Description  INTERVENTIONS:  - Support and protect assistance with activity based on assessment  - Modify environment to reduce risk of injury  - Provide assistive devices as appropriate  - Consider OT/PT consult to assist with strengthening/mobility  - Encourage toileting schedule  Outcome: Progressing

## 2020-07-26 NOTE — PLAN OF CARE
Problem: Patient Centered Care  Goal: Patient preferences are identified and integrated in the patient's plan of care  Description  Interventions:  - What would you like us to know as we care for you?  Patient is hard of hearing and normally wears a heari splint when sleeping at night.   - Monitor incision for any signs of infection.  - Pain management with oral medication.  - See additional Care Plan goals for specific interventions  Outcome: Progressing     Problem: SKIN/TISSUE INTEGRITY - ADULT  Goal: Ski Functional Mobility  Goal: Achieve highest/safest level of mobility/gait  Description  Interventions:  - Assess patient's functional ability and stability  - Promote increasing activity/tolerance for mobility and gait  - Educate and engage patient/family i in discharge planning  - Arrange for needed discharge resources and transportation as appropriate  - Identify discharge learning needs (meds, wound care, etc)  - Arrange for interpreters to assist at discharge as needed  - Consider post-discharge preferenc

## 2020-07-27 LAB
ANION GAP SERPL CALC-SCNC: 4 MMOL/L (ref 0–18)
BASOPHILS # BLD AUTO: 0.04 X10(3) UL (ref 0–0.2)
BASOPHILS NFR BLD AUTO: 0.4 %
BUN BLD-MCNC: 59 MG/DL (ref 7–18)
BUN/CREAT SERPL: 37.6 (ref 10–20)
CALCIUM BLD-MCNC: 8 MG/DL (ref 8.5–10.1)
CHLORIDE SERPL-SCNC: 110 MMOL/L (ref 98–112)
CO2 SERPL-SCNC: 25 MMOL/L (ref 21–32)
CREAT BLD-MCNC: 1.57 MG/DL (ref 0.7–1.3)
DEPRECATED RDW RBC AUTO: 52.4 FL (ref 35.1–46.3)
EOSINOPHIL # BLD AUTO: 0.73 X10(3) UL (ref 0–0.7)
EOSINOPHIL NFR BLD AUTO: 7.9 %
ERYTHROCYTE [DISTWIDTH] IN BLOOD BY AUTOMATED COUNT: 15.2 % (ref 11–15)
GLUCOSE BLD-MCNC: 124 MG/DL (ref 70–99)
GLUCOSE BLDC GLUCOMTR-MCNC: 111 MG/DL (ref 70–99)
GLUCOSE BLDC GLUCOMTR-MCNC: 116 MG/DL (ref 70–99)
GLUCOSE BLDC GLUCOMTR-MCNC: 123 MG/DL (ref 70–99)
GLUCOSE BLDC GLUCOMTR-MCNC: 126 MG/DL (ref 70–99)
HAV IGM SER QL: 2.2 MG/DL (ref 1.6–2.6)
HCT VFR BLD AUTO: 24.3 % (ref 39–53)
HGB BLD-MCNC: 8 G/DL (ref 13–17.5)
IMM GRANULOCYTES # BLD AUTO: 0.3 X10(3) UL (ref 0–1)
IMM GRANULOCYTES NFR BLD: 3.2 %
LYMPHOCYTES # BLD AUTO: 1.34 X10(3) UL (ref 1–4)
LYMPHOCYTES NFR BLD AUTO: 14.5 %
MCH RBC QN AUTO: 31.9 PG (ref 26–34)
MCHC RBC AUTO-ENTMCNC: 32.9 G/DL (ref 31–37)
MCV RBC AUTO: 96.8 FL (ref 80–100)
MONOCYTES # BLD AUTO: 1.11 X10(3) UL (ref 0.1–1)
MONOCYTES NFR BLD AUTO: 12 %
NEUTROPHILS # BLD AUTO: 5.72 X10 (3) UL (ref 1.5–7.7)
NEUTROPHILS # BLD AUTO: 5.72 X10(3) UL (ref 1.5–7.7)
NEUTROPHILS NFR BLD AUTO: 62 %
OSMOLALITY SERPL CALC.SUM OF ELEC: 306 MOSM/KG (ref 275–295)
PLATELET # BLD AUTO: 247 10(3)UL (ref 150–450)
POTASSIUM SERPL-SCNC: 5 MMOL/L (ref 3.5–5.1)
RBC # BLD AUTO: 2.51 X10(6)UL (ref 3.8–5.8)
SODIUM SERPL-SCNC: 139 MMOL/L (ref 136–145)
WBC # BLD AUTO: 9.2 X10(3) UL (ref 4–11)

## 2020-07-27 PROCEDURE — 99232 SBSQ HOSP IP/OBS MODERATE 35: CPT | Performed by: INTERNAL MEDICINE

## 2020-07-27 PROCEDURE — 99233 SBSQ HOSP IP/OBS HIGH 50: CPT | Performed by: HOSPITALIST

## 2020-07-27 RX ORDER — CEPHALEXIN 500 MG/1
500 CAPSULE ORAL EVERY 6 HOURS SCHEDULED
Status: DISCONTINUED | OUTPATIENT
Start: 2020-07-27 | End: 2020-07-30

## 2020-07-27 NOTE — PHYSICAL THERAPY NOTE
PHYSICAL THERAPY TREATMENT NOTE - INPATIENT     Room Number: 403/403-A       Presenting Problem: R THR (L BKA X20 yrs ago)    Problem List  Principal Problem:    Primary osteoarthritis of right hip  Active Problems:    Essential hypertension    Morbid obes therapy while in the hospital and upon D/C from the hospital at an acute rehab facility. Patient limited by environment in the hospital setting, and not his motivation to participate. Patient asked many times this session to transfer to bedside chair. Standing: Poor  Dynamic Standing: Poor -    AM-PAC '6-Clicks' INPATIENT SHORT FORM - BASIC MOBILITY  How much difficulty does the patient currently have. ..  -   Turning over in bed (including adjusting bedclothes, sheets and blankets)?: A Lot   -   Sitting home exercise program for ROM/strengthening per the instructions provided in preparation for discharge.    Goal #6  Current Status In progress

## 2020-07-27 NOTE — PROGRESS NOTES
[facility]  Nephrology Daily Progress Note    rTudi Jane  [id number]  69year old      HPI:   Trudi Jane is a 69year old male. Frustrated by lack of progress. Eating fair. No CP or SOB.       ROS:     Juan 07/27/2020    .0 07/27/2020    CREATSERUM 1.57 07/27/2020    BUN 59 07/27/2020     07/27/2020    K 5.0 07/27/2020     07/27/2020    CO2 25.0 07/27/2020     07/27/2020    CA 8.0 07/27/2020    MG 2.2 07/27/2020     Recent Labs   Lab (CHRONULAC) 10 GM/15ML solution 10 g, 10 g, Oral, Q6H PRN  •  HYDROmorphone HCl (DILAUDID) tab 2 mg, 2 mg, Oral, Q3H PRN  •  acetaminophen (TYLENOL) tab 650 mg, 650 mg, Oral, Q4H PRN **OR** HYDROcodone-acetaminophen (NORCO)  MG per tab 1 tablet, 1 ta Subcutaneous, TID CC  •  pregabalin (LYRICA) cap 150 mg, 150 mg, Oral, QPM    Allergies:  No Known Allergies    Input/Output:    Intake/Output Summary (Last 24 hours) at 7/27/2020 1118  Last data filed at 7/27/2020 1100  Gross per 24 hour   Intake 240 ml

## 2020-07-27 NOTE — PLAN OF CARE
Problem: Diabetes/Glucose Control  Goal: Glucose maintained within prescribed range  Description  INTERVENTIONS:  - Monitor Blood Glucose as ordered  - Assess for signs and symptoms of hyperglycemia and hypoglycemia  - Administer ordered medications to m protect limb and body alignment per provider's orders  - Instruct and reinforce with patient and family use of appropriate assistive device and precautions (e.g. spinal or hip dislocation precautions)  Outcome: Progressing     Problem: Impaired Functional Problem: DISCHARGE PLANNING  Goal: Discharge to home or other facility with appropriate resources  Description  INTERVENTIONS:  - Identify barriers to discharge w/pt and caregiver  - Include patient/family/discharge partner in discharge Armani Martinez

## 2020-07-27 NOTE — OCCUPATIONAL THERAPY NOTE
OCCUPATIONAL THERAPY TREATMENT NOTE - INPATIENT    Room Number: 403/403-A               Problem List  Principal Problem:    Primary osteoarthritis of right hip  Active Problems:    Essential hypertension    Morbid obesity with BMI of 40.0-44.9, adult ([facility] Balance activities; Energy conservation/work simplification techniques;ADL training;Functional transfer training; Endurance training;Patient/Family education;Patient/Family training; Compensatory technique education    SUBJECTIVE  Lets try It again    OBJECTI week

## 2020-07-27 NOTE — WOUND PROGRESS NOTE
Wound care attempted to see pt, wife and son present in room. Multiple attempts to re position patient for left buttock assessment. Pt continued to refuse to take assistance, take direction or allow me to turn him for visualization of the wound.  I offered

## 2020-07-27 NOTE — PROGRESS NOTES
[facility] Pharmacy Note:  Renal Adjustment for cephalexin [facility])    Deni Ortiz is a 69year old patient who has been receiving cephalexin (KEFLEX) 500 mg every 8 hrs.   Renal fx is improving and CrCl is now estimated at 51.4 mL/min (A) (based on

## 2020-07-27 NOTE — PROGRESS NOTES
[facility]    Progress Note    Dequan Recinos Patient Status:  Inpatient    10/20/1945 MRN [id number]   Location [facility] 4W/SW/SE Attending Kanchan Bunch Day # 7 PCP MD Jim Bauman POSSIBLE UNDIAGNOSED LELO.        Hyperlipidemia  CONT HOME MEDS.         Gout  CONT HOME MEDS.         Diabetes mellitus, type 2 ([facility]  CONT HOME MEDS, MONITOR ACCU CHECKS.    - well controlled      Anemia of post op blood loss transfused ok  - iron levels mi

## 2020-07-27 NOTE — PROGRESS NOTES
[facility]    Progress Note    Sunita Sings Patient Status:  Inpatient    10/20/1945 MRN [id number]   Location [facility] 4W/SW/SE Attending Kanchan Bunch # 7 PCP MD Paris Wright .0 07/27/2020    CREATSERUM 1.57 (H) 07/27/2020    BUN 59 (H) 07/27/2020     07/27/2020    K 5.0 07/27/2020     07/27/2020    CO2 25.0 07/27/2020     (H) 07/27/2020    CA 8.0 (L) 07/27/2020    ALB 2.0 (L) 07/25/2020    ALKPHO 1

## 2020-07-28 LAB
ALBUMIN SERPL-MCNC: 1.9 G/DL (ref 3.4–5)
ANION GAP SERPL CALC-SCNC: 5 MMOL/L (ref 0–18)
BASOPHILS # BLD AUTO: 0.06 X10(3) UL (ref 0–0.2)
BASOPHILS NFR BLD AUTO: 0.7 %
BUN BLD-MCNC: 52 MG/DL (ref 7–18)
BUN/CREAT SERPL: 39.7 (ref 10–20)
CALCIUM BLD-MCNC: 8.5 MG/DL (ref 8.5–10.1)
CHLORIDE SERPL-SCNC: 110 MMOL/L (ref 98–112)
CO2 SERPL-SCNC: 24 MMOL/L (ref 21–32)
CREAT BLD-MCNC: 1.31 MG/DL (ref 0.7–1.3)
DEPRECATED RDW RBC AUTO: 52.7 FL (ref 35.1–46.3)
EOSINOPHIL # BLD AUTO: 0.8 X10(3) UL (ref 0–0.7)
EOSINOPHIL NFR BLD AUTO: 9.1 %
ERYTHROCYTE [DISTWIDTH] IN BLOOD BY AUTOMATED COUNT: 15.4 % (ref 11–15)
GLUCOSE BLD-MCNC: 111 MG/DL (ref 70–99)
GLUCOSE BLDC GLUCOMTR-MCNC: 130 MG/DL (ref 70–99)
GLUCOSE BLDC GLUCOMTR-MCNC: 135 MG/DL (ref 70–99)
GLUCOSE BLDC GLUCOMTR-MCNC: 138 MG/DL (ref 70–99)
GLUCOSE BLDC GLUCOMTR-MCNC: 149 MG/DL (ref 70–99)
HAV IGM SER QL: 2 MG/DL (ref 1.6–2.6)
HCT VFR BLD AUTO: 25.5 % (ref 39–53)
HGB BLD-MCNC: 8.2 G/DL (ref 13–17.5)
IMM GRANULOCYTES # BLD AUTO: 0.36 X10(3) UL (ref 0–1)
IMM GRANULOCYTES NFR BLD: 4.1 %
LYMPHOCYTES # BLD AUTO: 1.35 X10(3) UL (ref 1–4)
LYMPHOCYTES NFR BLD AUTO: 15.3 %
MCH RBC QN AUTO: 31.5 PG (ref 26–34)
MCHC RBC AUTO-ENTMCNC: 32.2 G/DL (ref 31–37)
MCV RBC AUTO: 98.1 FL (ref 80–100)
MONOCYTES # BLD AUTO: 1.02 X10(3) UL (ref 0.1–1)
MONOCYTES NFR BLD AUTO: 11.6 %
NEUTROPHILS # BLD AUTO: 5.22 X10 (3) UL (ref 1.5–7.7)
NEUTROPHILS # BLD AUTO: 5.22 X10(3) UL (ref 1.5–7.7)
NEUTROPHILS NFR BLD AUTO: 59.2 %
OSMOLALITY SERPL CALC.SUM OF ELEC: 303 MOSM/KG (ref 275–295)
PHOSPHATE SERPL-MCNC: 3.2 MG/DL (ref 2.5–4.9)
PLATELET # BLD AUTO: 303 10(3)UL (ref 150–450)
POTASSIUM SERPL-SCNC: 4.7 MMOL/L (ref 3.5–5.1)
RBC # BLD AUTO: 2.6 X10(6)UL (ref 3.8–5.8)
SARS-COV-2 RNA RESP QL NAA+PROBE: NOT DETECTED
SODIUM SERPL-SCNC: 139 MMOL/L (ref 136–145)
WBC # BLD AUTO: 8.8 X10(3) UL (ref 4–11)

## 2020-07-28 PROCEDURE — 99232 SBSQ HOSP IP/OBS MODERATE 35: CPT | Performed by: INTERNAL MEDICINE

## 2020-07-28 PROCEDURE — 99233 SBSQ HOSP IP/OBS HIGH 50: CPT | Performed by: HOSPITALIST

## 2020-07-28 RX ORDER — HYDROCODONE BITARTRATE AND ACETAMINOPHEN 10; 325 MG/1; MG/1
1 TABLET ORAL EVERY 4 HOURS PRN
Status: CANCELLED | OUTPATIENT
Start: 2020-07-28 | End: 2020-08-10

## 2020-07-28 RX ORDER — HYDROCODONE BITARTRATE AND ACETAMINOPHEN 10; 325 MG/1; MG/1
1 TABLET ORAL EVERY 4 HOURS PRN
Status: CANCELLED | OUTPATIENT
Start: 2020-07-28

## 2020-07-28 RX ORDER — HYDROCODONE BITARTRATE AND ACETAMINOPHEN 10; 325 MG/1; MG/1
1 TABLET ORAL EVERY 4 HOURS PRN
Qty: 120 TABLET | Refills: 0 | Status: SHIPPED | OUTPATIENT
Start: 2020-07-28 | End: 2020-08-11

## 2020-07-28 NOTE — DIETARY NOTE
ADULT NUTRITION INITIAL ASSESSMENT    Pt is at moderate nutrition risk. Pt does not meet malnutrition criteria.       RECOMMENDATIONS TO MD:  See Nutrition Intervention     NUTRITION DIAGNOSIS/PROBLEM:  Increased nutrient needs related to physiological cau Glucose and wt management.     - Coordination of nutrition care: collaboration with other providers  - Discharge and transfer of nutrition care to new setting or provider: plans for transfer to Rehab pending insurance    ADMITTING DIAGNOSIS:   degenerative • Senna  17.2 mg Oral Nightly   • docusate sodium  100 mg Oral BID   • allopurinol  300 mg Oral Daily   • atorvastatin  40 mg Oral Nightly   • Metoprolol Succinate ER  25 mg Oral Daily   • Pioglitazone HCl  45 mg Oral Daily   • tamsulosin HCl  0.4 mg Ora

## 2020-07-28 NOTE — WOUND PROGRESS NOTE
Pt was seen during PT session. While pt was in a standing position, the left inner buttock was assessed. There is an intact, purple DTI to the area, bordered foam applied.  Pt has an extensive hx of back sx and pain and is unable to have any off loading per

## 2020-07-28 NOTE — WOUND PROGRESS NOTE
Will re-attempt to see pt during his session with PT in order to evaluate the reported left buttock DTI, spoke to RN to coordinate. Pt was unable/refusing to turn in bed or stand yesterday, will try while pt is out of bed today.

## 2020-07-28 NOTE — PROGRESS NOTES
SUBJECTIVE:  Chronic back pain, right hip pain levels 4-8. A little bit forgetful on pain medications according to wife. Participating better in therapy. Hip pain levels deep and aching. Chronic low back pain without radiation.   CC: Generalized weaknes Therapy: Still more maximal assist for mobility and transfers and lower extremity ADL/toileting  LUNG: normal Breath Sounds, no wheezes, no rhonchi, no tachypnea. CARDIOVASCULAR: Regular rate rhythm, Dorsalis pedis pulses normal bilat.   ABDOMEN: normal Br

## 2020-07-28 NOTE — CHRONIC PAIN
[facility]  Report of Consultation    Mary Chaparro Patient Status:  Inpatient    10/20/1945 MRN [id number]   Location [facility] 4W/SW/SE Attending Kanchan Bunch Day # 8 PCP Sydni Bangura MD     Date (PEPCID) tab 20 mg, 20 mg, Oral, Daily **OR** famoTIDine (PEPCID) injection 20 mg, 20 mg, Intravenous, Daily  •  PEG 3350 (MIRALAX) powder packet 17 g, 17 g, Oral, Daily  •  lactulose (CHRONULAC) 10 GM/15ML solution 10 g, 10 g, Oral, Q6H PRN  •  HYDROmorph Q15 Min PRN **OR** Glucose-Vitamin C (DEX-4) chewable tab 8 tablet, 8 tablet, Oral, Q15 Min PRN  •  Insulin Aspart Pen (NOVOLOG) 100 UNIT/ML flexpen 1-11 Units, 1-11 Units, Subcutaneous, TID CC  •  pregabalin (LYRICA) cap 150 mg, 150 mg, Oral, QPM    Revie interactive discussion. All questions were answered during extended questions and answer session. Patient agreeable to discussion plan.  Greater than 50% of the time was spent with counseling (nature of discussion centered around pain, therapy, and treatmen

## 2020-07-28 NOTE — PLAN OF CARE
Problem: Patient Centered Care  Goal: Patient preferences are identified and integrated in the patient's plan of care  Description  Interventions:  - What would you like us to know as we care for you?  Patient is hard of hearing and normally wears a heari splint when sleeping at night.   - Monitor incision for any signs of infection.  - Pain management with oral medication.  - See additional Care Plan goals for specific interventions  Outcome: Progressing     Problem: SKIN/TISSUE INTEGRITY - ADULT  Goal: Ski Functional Mobility  Goal: Achieve highest/safest level of mobility/gait  Description  Interventions:  - Assess patient's functional ability and stability  - Promote increasing activity/tolerance for mobility and gait  - Educate and engage patient/family i planning  - Arrange for needed discharge resources and transportation as appropriate  - Identify discharge learning needs (meds, wound care, etc)  - Arrange for interpreters to assist at discharge as needed  - Consider post-discharge preferences of patient

## 2020-07-28 NOTE — PROGRESS NOTES
[facility]  Nephrology Daily Progress Note    Deisy Roman  [id number]  69year old      HPI:   Deisy Roman is a 69year old male. Resting comfortably. No new c/o.        ROS:     Constitutional:  Negative for de 07/28/2020    CREATSERUM 1.31 07/28/2020    BUN 52 07/28/2020     07/28/2020    K 4.7 07/28/2020     07/28/2020    CO2 24.0 07/28/2020     07/28/2020    CA 8.5 07/28/2020    ALB 1.9 07/28/2020    MG 2.0 07/28/2020    PHOS 3.2 07/28/2020 Intravenous, Daily  •  PEG 3350 (MIRALAX) powder packet 17 g, 17 g, Oral, Daily  •  lactulose (CHRONULAC) 10 GM/15ML solution 10 g, 10 g, Oral, Q6H PRN  •  HYDROmorphone HCl (DILAUDID) tab 2 mg, 2 mg, Oral, Q3H PRN  •  acetaminophen (TYLENOL) tab 650 mg, 6 PRN  •  Insulin Aspart Pen (NOVOLOG) 100 UNIT/ML flexpen 1-11 Units, 1-11 Units, Subcutaneous, TID CC  •  pregabalin (LYRICA) cap 150 mg, 150 mg, Oral, QPM    Allergies:  No Known Allergies    Input/Output:    Intake/Output Summary (Last 24 hours) at 7/28/

## 2020-07-28 NOTE — PHYSICAL THERAPY NOTE
PHYSICAL THERAPY TREATMENT NOTE - INPATIENT     Room Number: 403/403-A       Presenting Problem: R THR (L BKA X20 yrs ago)    Problem List  Principal Problem:    Primary osteoarthritis of right hip  Active Problems:    Essential hypertension    Morbid obes limited by environment in the hospital setting, and not his motivation to participate. Patient asked many times this session to transfer to bedside chair.  Hospital is not able to provide safe equipment for the patient for him to be able to thrive, such as Total   -   Need to walk in hospital room?: A Lot   -   Climbing 3-5 steps with a railing?: Total     AM-PAC Score:  Raw Score: 10   Approx Degree of Impairment: 76.75%   Standardized Score (AM-PAC Scale): 32.29   CMS Modifier (G-Code): CL    FUNCTIONAL AB

## 2020-07-28 NOTE — PROGRESS NOTES
[place] [facility]    Progress Note    Calixto Myla Patient Status:  Inpatient    10/20/1945 MRN [id number]   Location [facility] 4W/SW/SE Attending Kanchan Bunch Day # 8 PCP Anup Ojeda MD        [facility] POSSIBLE UNDIAGNOSED LELO.        Hyperlipidemia  CONT HOME MEDS.         Gout  CONT HOME MEDS.         Diabetes mellitus, type 2 ([facility]  CONT HOME MEDS, MONITOR ACCU CHECKS.    - well controlled      Anemia of post op blood loss transfused ok  - iron levels mi

## 2020-07-29 LAB
ANION GAP SERPL CALC-SCNC: 4 MMOL/L (ref 0–18)
BASOPHILS # BLD: 0 X10(3) UL (ref 0–0.2)
BASOPHILS NFR BLD: 0 %
BUN BLD-MCNC: 62 MG/DL (ref 7–18)
BUN/CREAT SERPL: 47 (ref 10–20)
CALCIUM BLD-MCNC: 8.3 MG/DL (ref 8.5–10.1)
CHLORIDE SERPL-SCNC: 110 MMOL/L (ref 98–112)
CO2 SERPL-SCNC: 25 MMOL/L (ref 21–32)
CREAT BLD-MCNC: 1.32 MG/DL (ref 0.7–1.3)
DEPRECATED RDW RBC AUTO: 53.7 FL (ref 35.1–46.3)
EOSINOPHIL # BLD: 0.73 X10(3) UL (ref 0–0.7)
EOSINOPHIL NFR BLD: 8 %
ERYTHROCYTE [DISTWIDTH] IN BLOOD BY AUTOMATED COUNT: 15.6 % (ref 11–15)
GLUCOSE BLD-MCNC: 123 MG/DL (ref 70–99)
GLUCOSE BLDC GLUCOMTR-MCNC: 117 MG/DL (ref 70–99)
GLUCOSE BLDC GLUCOMTR-MCNC: 120 MG/DL (ref 70–99)
GLUCOSE BLDC GLUCOMTR-MCNC: 141 MG/DL (ref 70–99)
GLUCOSE BLDC GLUCOMTR-MCNC: 157 MG/DL (ref 70–99)
HAV IGM SER QL: 1.7 MG/DL (ref 1.6–2.6)
HCT VFR BLD AUTO: 26 % (ref 39–53)
HGB BLD-MCNC: 8.3 G/DL (ref 13–17.5)
LYMPHOCYTES NFR BLD: 1.55 X10(3) UL (ref 1–4)
LYMPHOCYTES NFR BLD: 17 %
MCH RBC QN AUTO: 31.4 PG (ref 26–34)
MCHC RBC AUTO-ENTMCNC: 31.9 G/DL (ref 31–37)
MCV RBC AUTO: 98.5 FL (ref 80–100)
MONOCYTES # BLD: 0.46 X10(3) UL (ref 0.1–1)
MONOCYTES NFR BLD: 5 %
MYELOCYTES # BLD: 0.27 X10(3) UL
MYELOCYTES NFR BLD: 3 %
NEUTROPHILS # BLD AUTO: 5.16 X10 (3) UL (ref 1.5–7.7)
NEUTROPHILS NFR BLD: 63 %
NEUTS BAND NFR BLD: 4 %
NEUTS HYPERSEG # BLD: 6.1 X10(3) UL (ref 1.5–7.7)
OSMOLALITY SERPL CALC.SUM OF ELEC: 307 MOSM/KG (ref 275–295)
PLATELET # BLD AUTO: 343 10(3)UL (ref 150–450)
PLATELET MORPHOLOGY: NORMAL
POTASSIUM SERPL-SCNC: 5 MMOL/L (ref 3.5–5.1)
RBC # BLD AUTO: 2.64 X10(6)UL (ref 3.8–5.8)
SODIUM SERPL-SCNC: 139 MMOL/L (ref 136–145)
TOTAL CELLS COUNTED: 100
WBC # BLD AUTO: 9.1 X10(3) UL (ref 4–11)

## 2020-07-29 PROCEDURE — 99233 SBSQ HOSP IP/OBS HIGH 50: CPT | Performed by: HOSPITALIST

## 2020-07-29 RX ORDER — DOXEPIN HYDROCHLORIDE 50 MG/1
1 CAPSULE ORAL DAILY
Refills: 0 | Status: SHIPPED | COMMUNITY
Start: 2020-07-30

## 2020-07-29 RX ORDER — MELATONIN
325
Qty: 30 TABLET | Refills: 0 | Status: SHIPPED | OUTPATIENT
Start: 2020-07-30

## 2020-07-29 NOTE — CHRONIC PAIN
Kanchan Goodson Patient Status:  Inpatient    10/20/1945 MRN [id number]   Location [facility] 4W/SW/SE Attending Kanchan Bunchissa Day # 9 PCP Alison Mckenzie MD     Date of Admiss 3350 (MIRALAX) powder packet 17 g, 17 g, Oral, Daily  •  lactulose (CHRONULAC) 10 GM/15ML solution 10 g, 10 g, Oral, Q6H PRN  •  HYDROmorphone HCl (DILAUDID) tab 2 mg, 2 mg, Oral, Q3H PRN  •  acetaminophen (TYLENOL) tab 650 mg, 650 mg, Oral, Q4H PRN **OR** (NOVOLOG) 100 UNIT/ML flexpen 1-11 Units, 1-11 Units, Subcutaneous, TID CC  •  pregabalin (LYRICA) cap 150 mg, 150 mg, Oral, QPM    Review of Systems:      Physical Exam:  Blood pressure (!) 172/66, pulse 83, temperature 98.3 °F (36.8 °C), temperature sour discussion centered around pain, therapy, and treatment options), face to face time, time spent reviewing data, obtaining patient information and discussing the care with the patients health care providers.

## 2020-07-29 NOTE — PAYOR COMM NOTE
--------------  ADMISSION REVIEW     Payor: Yeison Moreira  Subscriber #:  [id number]  Authorization Number: [id number]    Admit date: 7/20/20  Admit time: 1119       Admitting Physician: Lee Jacques MD  Attending Physician:  Tyson Jefferson DO 7/26/20      Musculoskeletal: Positive for joint swelling and joint pain. Neurological: Positive for weakness.    Psychiatric/Behavioral: Positive for confusion, sleep disturbance and decreased concentration.      Patient is  still confused with p and HCTZ was Wed am.  Hopefull that we will see renal function improve in a day or 2.   Not ready for transfer to rehab today            7/23/20        #1 right hip arthroplasty  Stable postop day 3     #2 hypertension stable hydrochlorothiazide and lisinop with therapy 2 days post op, acute inpatient rehab is not likely to make meaningful functional gains in average 9-11 day acute rehab stay, and would likely have to go to [facility] following.  Barriers include morbid obesity, pre-morbidly low endurance levels, hig 7/10/2020  lisinopril 20 MG Oral Tab, Take 20 mg by mouth daily. , Disp: , Rfl:   Metoprolol Succinate ER 25 MG Oral Tablet 24 Hr, Take 25 mg by mouth daily. , Disp: , Rfl:   allopurinol 300 MG Oral Tab, Take 300 mg by mouth daily. , Disp: , Rfl:   pregabalin X12   • OTHER Left     BELOW KNEE AMPUTEE   • SPINE SURGERY PROCEDURE UNLISTED      x 12 surgeries   • TONSILLECTOMY       History reviewed. No pertinent family history.   Social History    Tobacco Use      Smoking status: Never Smoker      Smokeless tobacc cyclobenzaprine (FLEXERIL) tab 10 mg     Date Action Dose Route User    7/28/2020 1005 Given 10 mg Oral Stef Walker, RN      docusate sodium (COLACE) cap 100 mg     Date Action Dose Route User    7/28/2020 2050 Given Other 100 mg Oral Todorovic, (VENELEX) ointment     Date Action Dose Route User    7/28/2020 2051 Given Other 60 g Topical Dov Villar RN    7/28/2020 1008 Given 60 g Topical Hector Mckenzie RN          REVIEWER COMMENTS:     OTHER:

## 2020-07-29 NOTE — PROGRESS NOTES
ORTHO    The patient is clear for transfer. Jaquita Peppers lift is okay to mobilize but he is wbat on the RLE. He does need dry gauze bid dressing changes and his wound cleaned daily.  However, if he does not mobilize he will develop sores and have significant l

## 2020-07-29 NOTE — PLAN OF CARE
Patient presently A/Ox4, forgetful at times. On room air. Pain managed with [place] PRN. Saline locked, ospina catheter in place. HOB maintained at 30 degrees. Accuchecks completed AC/HS. SCD maintained to RLE. Dressing change completed. Remote tele in place. Monitor incision for any signs of infection.  - Pain management with oral medication.  - See additional Care Plan goals for specific interventions  Outcome: Progressing  Goal: Patient/Family Short Term Goal  Description  Patient's Short Term Goal: Rehab pl and limitations with patient/family  Outcome: Progressing  Goal: Maintain proper alignment of affected body part  Description  INTERVENTIONS:  - Support and protect limb and body alignment per provider's orders  - Instruct and reinforce with patient and fa Provide assistive devices as appropriate  - Consider OT/PT consult to assist with strengthening/mobility  - Encourage toileting schedule  Outcome: Progressing     Problem: DISCHARGE PLANNING  Goal: Discharge to home or other facility with appropriate resou

## 2020-07-29 NOTE — PHYSICAL THERAPY NOTE
Attempted to see patient for physical therapy session. Patient currently in pain, was repositioned by RN staff and now has pain per patient. Also being switched to oral medications. Will attempt to see patient later as time permits. RN aware and agreeable.

## 2020-07-29 NOTE — PLAN OF CARE
Problem: Patient Centered Care  Goal: Patient preferences are identified and integrated in the patient's plan of care  Description  Interventions:  - What would you like us to know as we care for you?  Patient is hard of hearing and normally wears a heari splint when sleeping at night.   - Monitor incision for any signs of infection.  - Pain management with oral medication.  - See additional Care Plan goals for specific interventions  Outcome: Progressing     Problem: SKIN/TISSUE INTEGRITY - ADULT  Goal: Ski Functional Mobility  Goal: Achieve highest/safest level of mobility/gait  Description  Interventions:  - Assess patient's functional ability and stability  - Promote increasing activity/tolerance for mobility and gait  - Educate and engage patient/family i planning  - Arrange for needed discharge resources and transportation as appropriate  - Identify discharge learning needs (meds, wound care, etc)  - Arrange for interpreters to assist at discharge as needed  - Consider post-discharge preferences of patient CONTINUE TO MONITOR.

## 2020-07-29 NOTE — OCCUPATIONAL THERAPY NOTE
OCCUPATIONAL THERAPY TREATMENT NOTE - INPATIENT    Room Number: 403/403-A               Problem List  Principal Problem:    Primary osteoarthritis of right hip  Active Problems:    Essential hypertension    Morbid obesity with BMI of 40.0-44.9, adult ([facility] training; Endurance training;Patient/Family education;Patient/Family training; Compensatory technique education    SUBJECTIVE  Lets try It again    OBJECTIVE  Precautions: Hip abduction pillow;Bed/chair alarm    WEIGHT BEARING RESTRICTION  Weight Bearing Res

## 2020-07-29 NOTE — PROGRESS NOTES
[facility]    Progress Note    Meghana Whitlock Patient Status:  Inpatient    10/20/1945 MRN [id number]   Location [facility] 4W/SW/SE Attending Viviana Galan, [address] [facility] Day # 9 PCP Zhang Rios MD       Subjective: Or  morphINE sulfate (PF) 4 MG/ML injection 4 mg, 4 mg, Intravenous, Q2H PRN  Senna (SENOKOT) tab 17.2 mg, 17.2 mg, Oral, Nightly  docusate sodium (COLACE) cap 100 mg, 100 mg, Oral, BID  magnesium hydroxide (MILK OF MAGNESIA) 400 MG/5ML suspension 30 mL, 3 3.2 07/28/2020    TROP <0.045 07/24/2020       No results found.         Results:     CBC:    Lab Results   Component Value Date    WBC 9.1 07/29/2020    WBC 8.8 07/28/2020    WBC 9.2 07/27/2020     Lab Results   Component Value Date    HGB 8.3 (L) 07/29/20 apprec renal consult      pts wife wants acute rehab; MJ hesitating because of lift and max assist; pmr doctor approved,ins auth started but case management at [facility] hesitant                  Austyn Nicole, DO  >35 min spent with patient.  > 50% time was spe

## 2020-07-30 VITALS
WEIGHT: 315 LBS | HEIGHT: 76 IN | RESPIRATION RATE: 18 BRPM | HEART RATE: 82 BPM | OXYGEN SATURATION: 97 % | SYSTOLIC BLOOD PRESSURE: 128 MMHG | DIASTOLIC BLOOD PRESSURE: 61 MMHG | BODY MASS INDEX: 38.36 KG/M2 | TEMPERATURE: 98 F

## 2020-07-30 LAB
ANION GAP SERPL CALC-SCNC: 5 MMOL/L (ref 0–18)
BASOPHILS # BLD: 0.16 X10(3) UL (ref 0–0.2)
BASOPHILS NFR BLD: 2 %
BUN BLD-MCNC: 34 MG/DL (ref 7–18)
BUN/CREAT SERPL: 29.6 (ref 10–20)
CALCIUM BLD-MCNC: 8.7 MG/DL (ref 8.5–10.1)
CHLORIDE SERPL-SCNC: 110 MMOL/L (ref 98–112)
CO2 SERPL-SCNC: 25 MMOL/L (ref 21–32)
CREAT BLD-MCNC: 1.15 MG/DL (ref 0.7–1.3)
DEPRECATED RDW RBC AUTO: 54.5 FL (ref 35.1–46.3)
EOSINOPHIL # BLD: 0.49 X10(3) UL (ref 0–0.7)
EOSINOPHIL NFR BLD: 6 %
ERYTHROCYTE [DISTWIDTH] IN BLOOD BY AUTOMATED COUNT: 15.5 % (ref 11–15)
GLUCOSE BLD-MCNC: 95 MG/DL (ref 70–99)
GLUCOSE BLDC GLUCOMTR-MCNC: 106 MG/DL (ref 70–99)
GLUCOSE BLDC GLUCOMTR-MCNC: 107 MG/DL (ref 70–99)
HCT VFR BLD AUTO: 27.4 % (ref 39–53)
HGB BLD-MCNC: 8.9 G/DL (ref 13–17.5)
LYMPHOCYTES NFR BLD: 1.39 X10(3) UL (ref 1–4)
LYMPHOCYTES NFR BLD: 17 %
MCH RBC QN AUTO: 32 PG (ref 26–34)
MCHC RBC AUTO-ENTMCNC: 32.5 G/DL (ref 31–37)
MCV RBC AUTO: 98.6 FL (ref 80–100)
MONOCYTES # BLD: 0.49 X10(3) UL (ref 0.1–1)
MONOCYTES NFR BLD: 6 %
MORPHOLOGY: NORMAL
MYELOCYTES # BLD: 0.16 X10(3) UL
MYELOCYTES NFR BLD: 2 %
NEUTROPHILS # BLD AUTO: 4 X10 (3) UL (ref 1.5–7.7)
NEUTROPHILS NFR BLD: 59 %
NEUTS BAND NFR BLD: 8 %
NEUTS HYPERSEG # BLD: 5.49 X10(3) UL (ref 1.5–7.7)
NRBC BLD MANUAL-RTO: 1 %
OSMOLALITY SERPL CALC.SUM OF ELEC: 297 MOSM/KG (ref 275–295)
PLATELET # BLD AUTO: 371 10(3)UL (ref 150–450)
PLATELET MORPHOLOGY: NORMAL
POTASSIUM SERPL-SCNC: 4.9 MMOL/L (ref 3.5–5.1)
RBC # BLD AUTO: 2.78 X10(6)UL (ref 3.8–5.8)
SODIUM SERPL-SCNC: 140 MMOL/L (ref 136–145)
TOTAL CELLS COUNTED: 100
WBC # BLD AUTO: 8.2 X10(3) UL (ref 4–11)

## 2020-07-30 PROCEDURE — 99233 SBSQ HOSP IP/OBS HIGH 50: CPT | Performed by: HOSPITALIST

## 2020-07-30 NOTE — CM/SW NOTE
02: 15PM   MAUREEN received notification from RN that the peer to peer scheduled today at 1:30PM and completed by Dr. Alba Martínez had gone through and pts insurance had authorized the transfer. MAUREEN called and notified Izaiah Rooney at [place].  Izaiah Rooney called MAUREEN back and s

## 2020-07-30 NOTE — DISCHARGE SUMMARY
[facility]    Discharge Summary    Arnel Multani Patient Status:  Inpatient    10/20/1945 MRN [id number]   Location [facility] 4W/SW/SE Attending Kvng Peter, [address] [facility] Day # 10 PCP Serge Quiroga MD     Date of A would like to proceed       Hospital Course:        Primary osteoarthritis of right hip  S/P R HIP TOTAL ARTHROPLASTY, PAIN CONTROL, NEURO VASCULAR CHECKS, XARELTO DVT PROPHYLAXIS, PT/OT.    - pod #10. Doing ok. Pain uncontrolled.  Meds changed by Dr Edward Rivas; tablet (325 mg total) by mouth 2 (two) times daily. Quantity:  45 tablet  Refills:  0     cyclobenzaprine 10 MG Tabs  Commonly known as:  FLEXERIL      Take 1 tablet (10 mg total) by mouth every 8 (eight) hours as needed.    Quantity:  20 tablet  Refills: daily. Refills:  0     pregabalin 150 MG Caps  Commonly known as:  LYRICA      Take 150 mg by mouth daily as needed. Refills:  0     tamsulosin HCl 0.4 MG Caps  Commonly known as:  FLOMAX      Take by mouth daily.    Refills:  0     VITAMIN B 12 OR

## 2020-07-30 NOTE — PLAN OF CARE
Problem: Patient Centered Care  Goal: Patient preferences are identified and integrated in the patient's plan of care  Description  Interventions:  - What would you like us to know as we care for you?  Patient is hard of hearing and normally wears a heari splint when sleeping at night.   - Monitor incision for any signs of infection.  - Pain management with oral medication.  - See additional Care Plan goals for specific interventions  Outcome: Progressing     Problem: SKIN/TISSUE INTEGRITY - ADULT  Goal: Ski Functional Mobility  Goal: Achieve highest/safest level of mobility/gait  Description  Interventions:  - Assess patient's functional ability and stability  - Promote increasing activity/tolerance for mobility and gait  - Educate and engage patient/family i other facility with appropriate resources  Description  INTERVENTIONS:  - Identify barriers to discharge w/pt and caregiver  - Include patient/family/discharge partner in discharge planning  - Arrange for needed discharge resources and transportation as ap

## 2020-07-30 NOTE — PLAN OF CARE
Problem: Patient Centered Care  Goal: Patient preferences are identified and integrated in the patient's plan of care  Description  Interventions:  - What would you like us to know as we care for you?  Patient is hard of hearing and normally wears a heari RN  Outcome: Adequate for Discharge  7/30/2020 1117 by Tulio Vences RN  Outcome: Progressing  Goal: Patient/Family Short Term Goal  Description  Patient's Short Term Goal: Rehab placement when stable.     Interventions:   - Rehab placement till stable devices  - Assist with transfers and ambulation using safe patient handling equipment as needed  - Ensure adequate protection for wounds/incisions during mobilization  - Obtain PT/OT consults as needed  - Advance activity as appropriate  - Communicate orde on pain and pain management  - Manage/alleviate anxiety  - Utilize distraction and/or relaxation techniques  - Monitor for opioid side effects  - Notify MD/LIP if interventions unsuccessful or patient reports new pain  - Anticipate increased pain with acti order or complex needs related to functional status, cognitive ability or social support system  7/30/2020 1641 by Isidro Jeong RN  Outcome: Adequate for Discharge  7/30/2020 1117 by Isidro Jeong RN  Outcome: Progressing   Patient is alert and or

## 2020-07-30 NOTE — CHRONIC PAIN
[facility]  Inpatient Pain Management Progress Note      Patient name: Devorah Hui 69year old male  : 10/20/1945  MRN: [id number]    Diagnosis: Preop testing  (primary encounter diagnosis)  Degenerative joint disease of ri Nightly   • docusate sodium  100 mg Oral BID   • allopurinol  300 mg Oral Daily   • atorvastatin  40 mg Oral Nightly   • Metoprolol Succinate ER  25 mg Oral Daily   • Pioglitazone HCl  45 mg Oral Daily   • tamsulosin HCl  0.4 mg Oral Daily   • Insulin Aspa answered. Patient agreeable to plan.  Greater than 50% of the time was spent with counseling (nature of discussion centered around pain, therapy, and treatment options), face to face time, time spent reviewing data, obtaining patient information and discuss

## 2020-08-03 NOTE — PAYOR COMM NOTE
--------------  DISCHARGE REVIEW    Payor: Jeyson Woody #:  [id number]  Authorization Number: [id number]    Admit date: 7/20/20  Admit time:  1119  Discharge Date: 7/30/2020  5:59 PM     Admitting Physician: Linward Moritz, MD  Attending Physical Exam:   General appearance: alert, appears stated age and cooperative  Pulmonary:  clear to auscultation bilaterally  Cardiovascular: S1, S2 normal, no murmur, click, rub or gallop, regular rate and rhythm  Abdominal: soft, non-tender; bowel s lift and max assist; pmr doctor approved,ins auth started but case management at [facility] hesitant                       Dedra Lesches, DO  >35 min spent with patient.  > 50% time was spent counseling patient, discussing plan of care, discussing labs and imagin allopurinol 300 MG Tabs  Commonly known as:  ZYLOPRIM      Take 300 mg by mouth daily. Refills:  0     atorvastatin 40 MG Tabs  Commonly known as:  LIPITOR      Take 40 mg by mouth nightly.    Refills:  0     hydrochlorothiazide 12.5 MG Tabs  Commonly k signed by Ella Moncada DO on 7/30/2020  4:24 PM         REVIEWER COMMENTS

## 2020-08-21 ENCOUNTER — LAB ENCOUNTER (OUTPATIENT)
Dept: LAB | Facility: HOSPITAL | Age: 75
DRG: 856 | End: 2020-08-21
Attending: ORTHOPAEDIC SURGERY
Payer: MEDICARE

## 2020-08-21 ENCOUNTER — LAB ENCOUNTER (OUTPATIENT)
Dept: LAB | Facility: HOSPITAL | Age: 75
End: 2020-08-21
Attending: ORTHOPAEDIC SURGERY
Payer: MEDICARE

## 2020-08-21 DIAGNOSIS — Z01.818 PREOPERATIVE TESTING: ICD-10-CM

## 2020-08-21 DIAGNOSIS — Z01.818 PREOP TESTING: ICD-10-CM

## 2020-08-21 LAB
ALBUMIN SERPL-MCNC: 2.2 G/DL (ref 3.4–5)
ALBUMIN/GLOB SERPL: 0.4 {RATIO} (ref 1–2)
ALP LIVER SERPL-CCNC: 304 U/L (ref 45–117)
ALT SERPL-CCNC: 43 U/L (ref 16–61)
ANION GAP SERPL CALC-SCNC: 7 MMOL/L (ref 0–18)
ANTIBODY SCREEN: NEGATIVE
AST SERPL-CCNC: 35 U/L (ref 15–37)
BASOPHILS # BLD AUTO: 0.08 X10(3) UL (ref 0–0.2)
BASOPHILS NFR BLD AUTO: 0.7 %
BILIRUB SERPL-MCNC: 0.3 MG/DL (ref 0.1–2)
BUN BLD-MCNC: 37 MG/DL (ref 7–18)
BUN/CREAT SERPL: 26.1 (ref 10–20)
CALCIUM BLD-MCNC: 9.1 MG/DL (ref 8.5–10.1)
CHLORIDE SERPL-SCNC: 108 MMOL/L (ref 98–112)
CO2 SERPL-SCNC: 23 MMOL/L (ref 21–32)
CREAT BLD-MCNC: 1.42 MG/DL (ref 0.7–1.3)
CRP SERPL-MCNC: 1.78 MG/DL (ref ?–0.3)
DEPRECATED RDW RBC AUTO: 52.6 FL (ref 35.1–46.3)
EOSINOPHIL # BLD AUTO: 0.48 X10(3) UL (ref 0–0.7)
EOSINOPHIL NFR BLD AUTO: 4.5 %
ERYTHROCYTE [DISTWIDTH] IN BLOOD BY AUTOMATED COUNT: 14.8 % (ref 11–15)
ERYTHROCYTE [SEDIMENTATION RATE] IN BLOOD: 84 MM/HR (ref 0–20)
GLOBULIN PLAS-MCNC: 5.2 G/DL (ref 2.8–4.4)
GLUCOSE BLD-MCNC: 118 MG/DL (ref 70–99)
HCT VFR BLD AUTO: 30.8 % (ref 39–53)
HGB BLD-MCNC: 9.7 G/DL (ref 13–17.5)
IMM GRANULOCYTES # BLD AUTO: 0.27 X10(3) UL (ref 0–1)
IMM GRANULOCYTES NFR BLD: 2.5 %
INR BLD: 1.17 (ref 0.9–1.2)
LYMPHOCYTES # BLD AUTO: 1.92 X10(3) UL (ref 1–4)
LYMPHOCYTES NFR BLD AUTO: 18 %
M PROTEIN MFR SERPL ELPH: 7.4 G/DL (ref 6.4–8.2)
MCH RBC QN AUTO: 30.7 PG (ref 26–34)
MCHC RBC AUTO-ENTMCNC: 31.5 G/DL (ref 31–37)
MCV RBC AUTO: 97.5 FL (ref 80–100)
MONOCYTES # BLD AUTO: 0.72 X10(3) UL (ref 0.1–1)
MONOCYTES NFR BLD AUTO: 6.7 %
MRSA DNA SPEC QL NAA+PROBE: NEGATIVE
NEUTROPHILS # BLD AUTO: 7.22 X10 (3) UL (ref 1.5–7.7)
NEUTROPHILS # BLD AUTO: 7.22 X10(3) UL (ref 1.5–7.7)
NEUTROPHILS NFR BLD AUTO: 67.6 %
OSMOLALITY SERPL CALC.SUM OF ELEC: 296 MOSM/KG (ref 275–295)
PATIENT FASTING Y/N/NP: YES
PLATELET # BLD AUTO: 385 10(3)UL (ref 150–450)
POTASSIUM SERPL-SCNC: 4.2 MMOL/L (ref 3.5–5.1)
PROTHROMBIN TIME: 14.7 SECONDS (ref 11.8–14.5)
RBC # BLD AUTO: 3.16 X10(6)UL (ref 3.8–5.8)
RH BLOOD TYPE: POSITIVE
SODIUM SERPL-SCNC: 138 MMOL/L (ref 136–145)
WBC # BLD AUTO: 10.7 X10(3) UL (ref 4–11)

## 2020-08-21 PROCEDURE — 87641 MR-STAPH DNA AMP PROBE: CPT

## 2020-08-21 PROCEDURE — 85025 COMPLETE CBC W/AUTO DIFF WBC: CPT

## 2020-08-21 PROCEDURE — 36415 COLL VENOUS BLD VENIPUNCTURE: CPT

## 2020-08-21 PROCEDURE — 85652 RBC SED RATE AUTOMATED: CPT

## 2020-08-21 PROCEDURE — 85610 PROTHROMBIN TIME: CPT

## 2020-08-21 PROCEDURE — 86850 RBC ANTIBODY SCREEN: CPT

## 2020-08-21 PROCEDURE — 86901 BLOOD TYPING SEROLOGIC RH(D): CPT

## 2020-08-21 PROCEDURE — 86900 BLOOD TYPING SEROLOGIC ABO: CPT

## 2020-08-21 PROCEDURE — 80053 COMPREHEN METABOLIC PANEL: CPT

## 2020-08-21 PROCEDURE — 86140 C-REACTIVE PROTEIN: CPT

## 2020-08-22 LAB — SARS-COV-2 RNA RESP QL NAA+PROBE: NOT DETECTED

## 2020-08-24 ENCOUNTER — ANESTHESIA EVENT (OUTPATIENT)
Dept: SURGERY | Facility: HOSPITAL | Age: 75
DRG: 856 | End: 2020-08-24
Payer: MEDICARE

## 2020-08-24 ENCOUNTER — ANESTHESIA (OUTPATIENT)
Dept: SURGERY | Facility: HOSPITAL | Age: 75
DRG: 856 | End: 2020-08-24
Payer: MEDICARE

## 2020-08-24 ENCOUNTER — HOSPITAL ENCOUNTER (INPATIENT)
Facility: HOSPITAL | Age: 75
LOS: 4 days | Discharge: HOME HEALTH | DRG: 856 | End: 2020-08-28
Attending: ORTHOPAEDIC SURGERY | Admitting: ORTHOPAEDIC SURGERY
Payer: MEDICARE

## 2020-08-24 DIAGNOSIS — Z01.818 PREOP TESTING: Primary | ICD-10-CM

## 2020-08-24 DIAGNOSIS — M16.11 PRIMARY OSTEOARTHRITIS OF RIGHT HIP: ICD-10-CM

## 2020-08-24 DIAGNOSIS — M16.11 DEGENERATIVE JOINT DISEASE OF RIGHT HIP: ICD-10-CM

## 2020-08-24 DIAGNOSIS — L89.323 PRESSURE INJURY OF LEFT BUTTOCK, STAGE 3 (HCC): ICD-10-CM

## 2020-08-24 PROBLEM — T81.49XA SURGICAL WOUND INFECTION: Status: ACTIVE | Noted: 2020-08-24

## 2020-08-24 PROBLEM — Z98.890 STATUS POST INCISION AND DRAINAGE: Status: ACTIVE | Noted: 2020-08-24

## 2020-08-24 PROCEDURE — 99232 SBSQ HOSP IP/OBS MODERATE 35: CPT | Performed by: HOSPITALIST

## 2020-08-24 PROCEDURE — 0JBL0ZZ EXCISION OF RIGHT UPPER LEG SUBCUTANEOUS TISSUE AND FASCIA, OPEN APPROACH: ICD-10-PCS | Performed by: ORTHOPAEDIC SURGERY

## 2020-08-24 RX ORDER — HYDROCODONE BITARTRATE AND ACETAMINOPHEN 5; 325 MG/1; MG/1
1 TABLET ORAL AS NEEDED
Status: DISCONTINUED | OUTPATIENT
Start: 2020-08-24 | End: 2020-08-24 | Stop reason: HOSPADM

## 2020-08-24 RX ORDER — MORPHINE SULFATE 4 MG/ML
4 INJECTION, SOLUTION INTRAMUSCULAR; INTRAVENOUS EVERY 10 MIN PRN
Status: DISCONTINUED | OUTPATIENT
Start: 2020-08-24 | End: 2020-08-24 | Stop reason: HOSPADM

## 2020-08-24 RX ORDER — TRANEXAMIC ACID 10 MG/ML
1000 INJECTION, SOLUTION INTRAVENOUS ONCE
Status: DISCONTINUED | OUTPATIENT
Start: 2020-08-24 | End: 2020-08-28

## 2020-08-24 RX ORDER — DEXAMETHASONE SODIUM PHOSPHATE 4 MG/ML
VIAL (ML) INJECTION AS NEEDED
Status: DISCONTINUED | OUTPATIENT
Start: 2020-08-24 | End: 2020-08-24 | Stop reason: SURG

## 2020-08-24 RX ORDER — ACETAMINOPHEN 500 MG
1000 TABLET ORAL ONCE
Status: COMPLETED | OUTPATIENT
Start: 2020-08-24 | End: 2020-08-24

## 2020-08-24 RX ORDER — HYDROCODONE BITARTRATE AND ACETAMINOPHEN 5; 325 MG/1; MG/1
2 TABLET ORAL AS NEEDED
Status: DISCONTINUED | OUTPATIENT
Start: 2020-08-24 | End: 2020-08-24 | Stop reason: HOSPADM

## 2020-08-24 RX ORDER — METOPROLOL TARTRATE 5 MG/5ML
2.5 INJECTION INTRAVENOUS ONCE
Status: DISCONTINUED | OUTPATIENT
Start: 2020-08-24 | End: 2020-08-24 | Stop reason: HOSPADM

## 2020-08-24 RX ORDER — HYDROMORPHONE HYDROCHLORIDE 1 MG/ML
0.2 INJECTION, SOLUTION INTRAMUSCULAR; INTRAVENOUS; SUBCUTANEOUS EVERY 5 MIN PRN
Status: DISCONTINUED | OUTPATIENT
Start: 2020-08-24 | End: 2020-08-24 | Stop reason: HOSPADM

## 2020-08-24 RX ORDER — SENNOSIDES 8.6 MG
17.2 TABLET ORAL NIGHTLY
Status: DISCONTINUED | OUTPATIENT
Start: 2020-08-24 | End: 2020-08-28

## 2020-08-24 RX ORDER — MORPHINE SULFATE 2 MG/ML
1 INJECTION, SOLUTION INTRAMUSCULAR; INTRAVENOUS EVERY 2 HOUR PRN
Status: DISCONTINUED | OUTPATIENT
Start: 2020-08-24 | End: 2020-08-28

## 2020-08-24 RX ORDER — ATORVASTATIN CALCIUM 40 MG/1
40 TABLET, FILM COATED ORAL DAILY
Status: DISCONTINUED | OUTPATIENT
Start: 2020-08-25 | End: 2020-08-28

## 2020-08-24 RX ORDER — HYDROCODONE BITARTRATE AND ACETAMINOPHEN 5; 325 MG/1; MG/1
1 TABLET ORAL EVERY 4 HOURS PRN
Status: DISCONTINUED | OUTPATIENT
Start: 2020-08-24 | End: 2020-08-28

## 2020-08-24 RX ORDER — HYDROCODONE BITARTRATE AND ACETAMINOPHEN 10; 325 MG/1; MG/1
1 TABLET ORAL EVERY 4 HOURS PRN
Status: DISCONTINUED | OUTPATIENT
Start: 2020-08-24 | End: 2020-08-28

## 2020-08-24 RX ORDER — LISINOPRIL 20 MG/1
20 TABLET ORAL DAILY
Status: DISCONTINUED | OUTPATIENT
Start: 2020-08-25 | End: 2020-08-28

## 2020-08-24 RX ORDER — SODIUM CHLORIDE, SODIUM LACTATE, POTASSIUM CHLORIDE, CALCIUM CHLORIDE 600; 310; 30; 20 MG/100ML; MG/100ML; MG/100ML; MG/100ML
INJECTION, SOLUTION INTRAVENOUS CONTINUOUS
Status: DISCONTINUED | OUTPATIENT
Start: 2020-08-24 | End: 2020-08-24 | Stop reason: HOSPADM

## 2020-08-24 RX ORDER — METOCLOPRAMIDE HYDROCHLORIDE 5 MG/ML
10 INJECTION INTRAMUSCULAR; INTRAVENOUS EVERY 6 HOURS PRN
Status: ACTIVE | OUTPATIENT
Start: 2020-08-24 | End: 2020-08-26

## 2020-08-24 RX ORDER — CEFAZOLIN SODIUM/WATER 2 G/20 ML
2 SYRINGE (ML) INTRAVENOUS ONCE
Status: COMPLETED | OUTPATIENT
Start: 2020-08-24 | End: 2020-08-24

## 2020-08-24 RX ORDER — HYDROMORPHONE HYDROCHLORIDE 1 MG/ML
0.4 INJECTION, SOLUTION INTRAMUSCULAR; INTRAVENOUS; SUBCUTANEOUS EVERY 5 MIN PRN
Status: DISCONTINUED | OUTPATIENT
Start: 2020-08-24 | End: 2020-08-24 | Stop reason: HOSPADM

## 2020-08-24 RX ORDER — MORPHINE SULFATE 10 MG/ML
6 INJECTION, SOLUTION INTRAMUSCULAR; INTRAVENOUS EVERY 10 MIN PRN
Status: DISCONTINUED | OUTPATIENT
Start: 2020-08-24 | End: 2020-08-24 | Stop reason: HOSPADM

## 2020-08-24 RX ORDER — POLYETHYLENE GLYCOL 3350 17 G/17G
17 POWDER, FOR SOLUTION ORAL DAILY PRN
Status: DISCONTINUED | OUTPATIENT
Start: 2020-08-24 | End: 2020-08-28

## 2020-08-24 RX ORDER — METOPROLOL SUCCINATE 25 MG/1
25 TABLET, EXTENDED RELEASE ORAL DAILY
Status: DISCONTINUED | OUTPATIENT
Start: 2020-08-25 | End: 2020-08-28

## 2020-08-24 RX ORDER — DOCUSATE SODIUM 100 MG/1
100 CAPSULE, LIQUID FILLED ORAL 2 TIMES DAILY
Status: DISCONTINUED | OUTPATIENT
Start: 2020-08-24 | End: 2020-08-28

## 2020-08-24 RX ORDER — SODIUM CHLORIDE, SODIUM LACTATE, POTASSIUM CHLORIDE, CALCIUM CHLORIDE 600; 310; 30; 20 MG/100ML; MG/100ML; MG/100ML; MG/100ML
INJECTION, SOLUTION INTRAVENOUS CONTINUOUS
Status: DISCONTINUED | OUTPATIENT
Start: 2020-08-24 | End: 2020-08-24 | Stop reason: ALTCHOICE

## 2020-08-24 RX ORDER — HYDROCHLOROTHIAZIDE 12.5 MG/1
12.5 CAPSULE, GELATIN COATED ORAL DAILY
Status: DISCONTINUED | OUTPATIENT
Start: 2020-08-25 | End: 2020-08-28

## 2020-08-24 RX ORDER — HYDROCODONE BITARTRATE AND ACETAMINOPHEN 10; 325 MG/1; MG/1
1 TABLET ORAL EVERY 4 HOURS PRN
Status: DISCONTINUED | OUTPATIENT
Start: 2020-08-24 | End: 2020-08-24 | Stop reason: CLARIF

## 2020-08-24 RX ORDER — HALOPERIDOL 5 MG/ML
0.25 INJECTION INTRAMUSCULAR ONCE AS NEEDED
Status: DISCONTINUED | OUTPATIENT
Start: 2020-08-24 | End: 2020-08-24 | Stop reason: HOSPADM

## 2020-08-24 RX ORDER — PROCHLORPERAZINE EDISYLATE 5 MG/ML
5 INJECTION INTRAMUSCULAR; INTRAVENOUS ONCE AS NEEDED
Status: DISCONTINUED | OUTPATIENT
Start: 2020-08-24 | End: 2020-08-24 | Stop reason: HOSPADM

## 2020-08-24 RX ORDER — ONDANSETRON 2 MG/ML
4 INJECTION INTRAMUSCULAR; INTRAVENOUS EVERY 4 HOURS PRN
Status: DISCONTINUED | OUTPATIENT
Start: 2020-08-24 | End: 2020-08-28

## 2020-08-24 RX ORDER — MORPHINE SULFATE 4 MG/ML
4 INJECTION, SOLUTION INTRAMUSCULAR; INTRAVENOUS EVERY 2 HOUR PRN
Status: DISCONTINUED | OUTPATIENT
Start: 2020-08-24 | End: 2020-08-28

## 2020-08-24 RX ORDER — VANCOMYCIN/0.9 % SOD CHLORIDE 1.75 G/5
15 PLASTIC BAG, INJECTION (ML) INTRAVENOUS ONCE
Status: COMPLETED | OUTPATIENT
Start: 2020-08-24 | End: 2020-08-24

## 2020-08-24 RX ORDER — LIDOCAINE HYDROCHLORIDE 10 MG/ML
INJECTION, SOLUTION EPIDURAL; INFILTRATION; INTRACAUDAL; PERINEURAL AS NEEDED
Status: DISCONTINUED | OUTPATIENT
Start: 2020-08-24 | End: 2020-08-24 | Stop reason: SURG

## 2020-08-24 RX ORDER — MAGNESIUM OXIDE 400 MG (241.3 MG MAGNESIUM) TABLET
3 TABLET NIGHTLY
Status: DISCONTINUED | OUTPATIENT
Start: 2020-08-24 | End: 2020-08-28

## 2020-08-24 RX ORDER — SODIUM PHOSPHATE, DIBASIC AND SODIUM PHOSPHATE, MONOBASIC 7; 19 G/133ML; G/133ML
1 ENEMA RECTAL ONCE AS NEEDED
Status: DISCONTINUED | OUTPATIENT
Start: 2020-08-24 | End: 2020-08-28

## 2020-08-24 RX ORDER — FAMOTIDINE 20 MG/1
20 TABLET ORAL 2 TIMES DAILY
Status: DISCONTINUED | OUTPATIENT
Start: 2020-08-24 | End: 2020-08-24

## 2020-08-24 RX ORDER — NALOXONE HYDROCHLORIDE 0.4 MG/ML
80 INJECTION, SOLUTION INTRAMUSCULAR; INTRAVENOUS; SUBCUTANEOUS AS NEEDED
Status: DISCONTINUED | OUTPATIENT
Start: 2020-08-24 | End: 2020-08-24 | Stop reason: HOSPADM

## 2020-08-24 RX ORDER — ONDANSETRON 2 MG/ML
4 INJECTION INTRAMUSCULAR; INTRAVENOUS ONCE AS NEEDED
Status: DISCONTINUED | OUTPATIENT
Start: 2020-08-24 | End: 2020-08-24 | Stop reason: HOSPADM

## 2020-08-24 RX ORDER — MORPHINE SULFATE 4 MG/ML
2 INJECTION, SOLUTION INTRAMUSCULAR; INTRAVENOUS EVERY 10 MIN PRN
Status: DISCONTINUED | OUTPATIENT
Start: 2020-08-24 | End: 2020-08-24 | Stop reason: HOSPADM

## 2020-08-24 RX ORDER — FAMOTIDINE 10 MG/ML
20 INJECTION, SOLUTION INTRAVENOUS 2 TIMES DAILY
Status: DISCONTINUED | OUTPATIENT
Start: 2020-08-24 | End: 2020-08-24

## 2020-08-24 RX ORDER — SODIUM CHLORIDE 9 MG/ML
INJECTION, SOLUTION INTRAVENOUS CONTINUOUS
Status: DISCONTINUED | OUTPATIENT
Start: 2020-08-24 | End: 2020-08-28

## 2020-08-24 RX ORDER — PANTOPRAZOLE SODIUM 40 MG/1
40 TABLET, DELAYED RELEASE ORAL
COMMUNITY

## 2020-08-24 RX ORDER — ALLOPURINOL 300 MG/1
300 TABLET ORAL DAILY
Status: DISCONTINUED | OUTPATIENT
Start: 2020-08-25 | End: 2020-08-28

## 2020-08-24 RX ORDER — ONDANSETRON 2 MG/ML
INJECTION INTRAMUSCULAR; INTRAVENOUS AS NEEDED
Status: DISCONTINUED | OUTPATIENT
Start: 2020-08-24 | End: 2020-08-24 | Stop reason: SURG

## 2020-08-24 RX ORDER — METOCLOPRAMIDE 10 MG/1
10 TABLET ORAL ONCE
Status: COMPLETED | OUTPATIENT
Start: 2020-08-24 | End: 2020-08-24

## 2020-08-24 RX ORDER — HYDROMORPHONE HYDROCHLORIDE 1 MG/ML
0.6 INJECTION, SOLUTION INTRAMUSCULAR; INTRAVENOUS; SUBCUTANEOUS EVERY 5 MIN PRN
Status: DISCONTINUED | OUTPATIENT
Start: 2020-08-24 | End: 2020-08-24 | Stop reason: HOSPADM

## 2020-08-24 RX ORDER — VANCOMYCIN/0.9 % SOD CHLORIDE 1.75 G/5
15 PLASTIC BAG, INJECTION (ML) INTRAVENOUS EVERY 12 HOURS
Status: DISCONTINUED | OUTPATIENT
Start: 2020-08-25 | End: 2020-08-26

## 2020-08-24 RX ORDER — PROCHLORPERAZINE EDISYLATE 5 MG/ML
10 INJECTION INTRAMUSCULAR; INTRAVENOUS EVERY 6 HOURS PRN
Status: ACTIVE | OUTPATIENT
Start: 2020-08-24 | End: 2020-08-26

## 2020-08-24 RX ORDER — MORPHINE SULFATE 2 MG/ML
2 INJECTION, SOLUTION INTRAMUSCULAR; INTRAVENOUS EVERY 2 HOUR PRN
Status: DISCONTINUED | OUTPATIENT
Start: 2020-08-24 | End: 2020-08-28

## 2020-08-24 RX ORDER — DEXTROSE MONOHYDRATE 25 G/50ML
50 INJECTION, SOLUTION INTRAVENOUS
Status: DISCONTINUED | OUTPATIENT
Start: 2020-08-24 | End: 2020-08-24 | Stop reason: HOSPADM

## 2020-08-24 RX ORDER — ASPIRIN 325 MG
325 TABLET ORAL 2 TIMES DAILY
Status: DISCONTINUED | OUTPATIENT
Start: 2020-08-24 | End: 2020-08-28

## 2020-08-24 RX ORDER — ACETAMINOPHEN 325 MG/1
650 TABLET ORAL EVERY 4 HOURS PRN
Status: DISCONTINUED | OUTPATIENT
Start: 2020-08-24 | End: 2020-08-28

## 2020-08-24 RX ORDER — PREGABALIN 75 MG/1
150 CAPSULE ORAL NIGHTLY
Status: DISCONTINUED | OUTPATIENT
Start: 2020-08-24 | End: 2020-08-28

## 2020-08-24 RX ORDER — HYDROCODONE BITARTRATE AND ACETAMINOPHEN 10; 325 MG/1; MG/1
2 TABLET ORAL EVERY 4 HOURS PRN
Status: DISCONTINUED | OUTPATIENT
Start: 2020-08-24 | End: 2020-08-28

## 2020-08-24 RX ORDER — FAMOTIDINE 20 MG/1
20 TABLET ORAL ONCE
Status: COMPLETED | OUTPATIENT
Start: 2020-08-24 | End: 2020-08-24

## 2020-08-24 RX ORDER — BISACODYL 10 MG
10 SUPPOSITORY, RECTAL RECTAL
Status: DISCONTINUED | OUTPATIENT
Start: 2020-08-24 | End: 2020-08-28

## 2020-08-24 RX ORDER — PANTOPRAZOLE SODIUM 40 MG/1
40 TABLET, DELAYED RELEASE ORAL
Status: DISCONTINUED | OUTPATIENT
Start: 2020-08-25 | End: 2020-08-28

## 2020-08-24 RX ADMIN — CEFAZOLIN SODIUM/WATER 3 G: 2 G/20 ML SYRINGE (ML) INTRAVENOUS at 16:04:00

## 2020-08-24 RX ADMIN — LIDOCAINE HYDROCHLORIDE 50 MG: 10 INJECTION, SOLUTION EPIDURAL; INFILTRATION; INTRACAUDAL; PERINEURAL at 15:58:00

## 2020-08-24 RX ADMIN — DEXAMETHASONE SODIUM PHOSPHATE 4 MG: 4 MG/ML VIAL (ML) INJECTION at 16:08:00

## 2020-08-24 RX ADMIN — ONDANSETRON 4 MG: 2 INJECTION INTRAMUSCULAR; INTRAVENOUS at 17:00:00

## 2020-08-24 RX ADMIN — SODIUM CHLORIDE, SODIUM LACTATE, POTASSIUM CHLORIDE, CALCIUM CHLORIDE: 600; 310; 30; 20 INJECTION, SOLUTION INTRAVENOUS at 17:24:00

## 2020-08-24 NOTE — ANESTHESIA POSTPROCEDURE EVALUATION
Patient: Meghana Whitlock    Procedure Summary     Date:  08/24/20 Room / Location:  [address] [place] [address] MAIN OR 12 / [address] [place] [address] MAIN OR    Anesthesia Start:  1516 Anesthesia Stop:  8143    Procedure:  HIP IRRIGATION AND DERBRIDEMENT (Right ) Diagnosis:  (infected right

## 2020-08-24 NOTE — ANESTHESIA PREPROCEDURE EVALUATION
Anesthesia PreOp Note    HPI:     Jonas Elmore is a 69year old male who presents for preoperative consultation requested by: Angel Cobb MD    Date of Surgery: 8/24/2020    Procedure(s):  HIP IRRIGATION AND DERBRIDEMENT  Indication: infe time  cyclobenzaprine 10 MG Oral Tab, Take 1 tablet (10 mg total) by mouth every 8 (eight) hours as needed. , Disp: 20 tablet, Rfl: 0  Insulin Aspart Pen 100 UNIT/ML Subcutaneous Solution Pen-injector, Inject 1-11 Units into the skin 3 (three) times daily w 25 mg, Oral, Once PRN, Sherry Berg MD  famoTIDine (PEPCID) tab 20 mg, 20 mg, Oral, Once, Sherry Berg MD  Metoclopramide HCl (REGLAN) tab 10 mg, 10 mg, Oral, Once, Sherry Berg MD    No current [facility]-ordered outpatient medications on file. (L) 08/21/2020    HCT 30.8 (L) 08/21/2020    MCV 97.5 08/21/2020    MCH 30.7 08/21/2020    MCHC 31.5 08/21/2020    RDW 14.8 08/21/2020    .0 08/21/2020     Lab Results   Component Value Date     08/21/2020    K 4.2 08/21/2020     08/21/2 risks including possible dental damage if relevant, major complications, and any alternative forms of anesthetic management. All of the patient's questions were answered to the best of my ability. The patient desires the anesthetic management as planned.

## 2020-08-24 NOTE — OPERATIVE REPORT
[facility]    Revision ORLANDO Operative Note    Deisy Ardmore Patient Status:  Surgery Admit - Inpt    1959 MRN [id number]   [place] [address] Attending Rubi Felder MD     PCP Alicja Bob MD limited to: infection, nerve injury (peroneal palsy and superficial numbness specifically), vessel damage, VTE, need for re-operation, dislocation, limb length discrepancy, loss of limb and loss of life.   Furthermore this is a revision procedure and the pa significant fluid. All the fluid we obtain was from the subcutaneous tissue. We went ahead and sharply debrided with a knife and a rongeur any tissue that appeared to be nonviable. We did send this tissue for cultures.        PROCEDURE:    Once exposed w positive he will need a PICC line and 6 weeks of IV antibiotics my suspicion is that this likely will need to 6 weeks we will wait and see.   We will continue to follow the patient closely while in the hospital.    Implants:    Femoral component: NA  Acetab

## 2020-08-24 NOTE — H&P
History & Physical Examination    Patient Name: Laureano Wren  MRN: [id number]  CSN: [id number]  YOB: 1945    Diagnosis: Delayed wound healing R ORLANDO    Present Illness: Laureano Wren is a 69year old yo male with a hi Rfl: , 7/20/2020 at 0600  pregabalin 150 MG Oral Cap, Take 150 mg by mouth nightly.  , Disp: , Rfl: , 7/20/2020 at 0600  tamsulosin (FLOMAX) cap, Take by mouth daily. , Disp: , Rfl: , Past Month at Unknown time  Cholecalciferol (VITAMIN D-3 OR), Take 1 tabl Smokeless tobacco: Never Used    Alcohol use: Yes      Frequency: 2-4 times a month      Comment: SOCIALLY      SYSTEM Check if Review is Normal Check if Physical Exam is Normal If not normal, please explain:   ITALO [ x ] [ ]    Abeba Cuevas [ x ] [ ]    H

## 2020-08-24 NOTE — PROGRESS NOTES
[facility]    Progress Note    Denny Abraham Patient Status:  Inpatient    10/20/1945 MRN [id number]   Location [address] UNIT Attending Vlad Harvey MD   Hosp Day # 0 PCP Karla Rocha MD subcutaneous tissue (sharp debridement), PAIN CONTROL, CULTURES PENDING, IV VANCOMYCIN, ASA DVT PROPHYLAXIS, PT/OT. Essential hypertension  CONT HOME MEDS, MONITOR.        Morbid obesity with BMI of 40.0-44.9, adult (HCC)  MONITOR RESPIRATORY AND HEMO

## 2020-08-24 NOTE — ANESTHESIA PROCEDURE NOTES
Airway  Date/Time: 8/24/2020 4:10 PM  Urgency: Elective    Airway not difficult    General Information and Staff    Patient location during procedure: OR  Anesthesiologist: Catracho Cohen MD  Performed: anesthesiologist     Indications and Patient Conditi

## 2020-08-24 NOTE — OPERATIVE REPORT
[facility] - [facility]    Revision ORLANDO Brief Operative Note    Deisy Wiley Patient Status:  Surgery Admit - Inpt    1959 MRN [id number]   [place] [address] Attending Rubi Felder MD     PCP Alicja Bob,

## 2020-08-25 PROCEDURE — 99233 SBSQ HOSP IP/OBS HIGH 50: CPT | Performed by: HOSPITALIST

## 2020-08-25 NOTE — PLAN OF CARE
Alexis Burk is doing better tonight after specialty mattress in use. 1st step overlay mattress ordered until wound nurse able to evaluate the patient later today. Right hip incision with provena wound vac in place as well as hemovac drain, both remain intact.  Lef diabetes  Outcome: Progressing     Problem: Patient/Family Goals  Goal: Patient/Family Long Term Goal  Description  Patient's Long Term Goal: To walk again with a walker and my prosthesis    Interventions:  - Hip surgery  -PT/OT  -Encourage movement and am Promote increasing activity/tolerance for mobility and gait  - Educate and engage patient/family in tolerated activity level and precautions  Outcome: Progressing     Problem: PAIN - ADULT  Goal: Verbalizes/displays adequate comfort level or patient's stat interpreters to assist at discharge as needed  - Consider post-discharge preferences of patient/family/discharge partner  - Complete POLST form as appropriate  - Assess patient's ability to be responsible for managing their own health  - Refer to [facility]

## 2020-08-25 NOTE — RESPIRATORY THERAPY NOTE
LELO ASSESSMENT:    Pt does not have a previous diagnosis of LELO. Pt does not routinely use a CPAP device at home. This pt is not suspected to be at high risk for LELO and sleep lab packet was not provided to patient for outpatient follow-up.

## 2020-08-25 NOTE — PROGRESS NOTES
[facility] [place] note: Duplicate Proton Pump Inhibitor with Histamine 2 blocker. Elle Davis is a 69year old patient who has been prescribed both famotidine (Pepcid)  And pantoprazole (Protonix).   Pepcid was discontinued per P&T approved prot

## 2020-08-25 NOTE — WOUND PROGRESS NOTE
WOUND CARE NOTE    History:  Past Medical History:   Diagnosis Date   • Back problem    • BPH (benign prostatic hyperplasia)    • Calculus of kidney    • Diabetes ([facility] [address]    • Gout    • High blood pressure    • High cholesterol    • History of blood transf \"Excisional irrigation debridement of the right hip wound, to include skin and subcutaneous tissue (sharp debridement)\" by Dr. Rosanna Ludwig 8/24/20, right hip hemovac drain and Prevena are intact. Left BKA, right heel is intact.  The pt. was able to turn to the

## 2020-08-25 NOTE — PLAN OF CARE
Problem: Patient Centered Care  Goal: Patient preferences are identified and integrated in the patient's plan of care  Description  Interventions:  - What would you like us to know as we care for you?  I'm a retired /   - Provide ti needed.   - See additional Care Plan goals for specific interventions   Outcome: Progressing     Problem: SKIN/TISSUE INTEGRITY - ADULT  Goal: Incision(s), wounds(s) or drain site(s) healing without S/S of infection  Description  INTERVENTIONS:  - Assess an response  - Consider cultural and social influences on pain and pain management  - Manage/alleviate anxiety  - Utilize distraction and/or relaxation techniques  - Monitor for opioid side effects  - Notify MD/LIP if interventions unsuccessful or patient rep needed. Patient is currently in room air, denies shortness of breathing nor chest pain. Patient was up in chair with eggcrate on his bottom for almost 2 hours this morning.   Stood with 2 moderate assist using a gait belt and walked with minimal assist in

## 2020-08-25 NOTE — OCCUPATIONAL THERAPY NOTE
OCCUPATIONAL THERAPY EVALUATION - INPATIENT      Room Number: 423/423-A  Evaluation Date: 8/25/2020  Type of Evaluation: Initial       Physician Order: IP Consult to Occupational Therapy  Reason for Therapy: ADL/IADL Dysfunction and Discharge Planning    O Recommendations: None    PLAN  OT Treatment Plan: ADL training; Compensatory technique education       OCCUPATIONAL THERAPY MEDICAL/SOCIAL HISTORY     Problem List   Principal Problem:    Surgical wound infection  Active Problems:    Essential hypertension on Room Air at Rest: 99             COGNITION  Overall Cognitive Status:  WFL - within functional limits    RANGE OF MOTION   Upper extremity ROM is within functional limits     STRENGTH ASSESSMENT  Upper extremity strength is within functional limits

## 2020-08-25 NOTE — PHYSICAL THERAPY NOTE
PHYSICAL THERAPY EVALUATION - INPATIENT     Room Number: 423/423-A  Evaluation Date: 8/25/2020  Type of Evaluation: Initial   Physician Order: PT Eval and Treat    Presenting Problem: R hip I&D due to infection (R THR 7/20)  Reason for Therapy: Mobility D patients with this level of impairment may benefit from home with HHPT vs. JESSIE.  Pt wanting to return home at d/c and go to OP PT; believe he is able to return home with his wife at d/c.     DISCHARGE RECOMMENDATIONS  PT Discharge Recommendations: Home with [facility] after d/c from hospital from his R THR and has been home for ~10 days. Pt does have chronic LBP and wears a back brace as well as hx of a L BKA (has a prosthesis)    SUBJECTIVE  \"I've been walking at home. \"    PHYSICAL THERAPY EXAMINATION Assessment   Gait Assistance: Contact guard assist  Distance (ft): 70(with chair follow)  Assistive Device: Rolling walker  Pattern: R Decreased stance time(step to, flexed posture, fatigued at end)  Stoop/Curb Assistance: Not tested  Comment : -    Bed Mo

## 2020-08-25 NOTE — CONSULTS
[place] [facility] - [facility] ID CONSULT NOTE    Klarissa Wang Patient Status:  Inpatient    10/20/1945 MRN [id number]   Location [facility] 4W/SW/SE Attending Ronn Camp MD   Hosp Day # 1 PCP Arin Goodrich MD       Reason that he does not use drugs.     Allergies:  No Known Allergies    Medications:    Current Facility-Administered Medications:   •  collagenase (SANTYL) 250 UNIT/GM ointment, , Topical, Q shift  •  tranexamic acid (CYKLOKAPRON) IVPB premix 1,000 mg, 1,000 mg, tab 20 mg, 20 mg, Oral, Daily  •  Metoprolol Succinate ER (Toprol XL) 24 hr tab 25 mg, 25 mg, Oral, Daily  •  Pantoprazole Sodium (PROTONIX) EC tab 40 mg, 40 mg, Oral, QAM AC  •  pregabalin (LYRICA) cap 150 mg, 150 mg, Oral, Nightly    Review of Systems: ALT 43 30   BILT 0.3 0.3   TP 7.4 6.1*       Microbiology: Reviewed in EMR    Radiology: Reviewed    ASSESSMENT:    Antibiotics: IV vanocmycin    67-year-old male with a history of morbid obesity, HTN, HLD, LELO, gout now presents to hospital after recent

## 2020-08-25 NOTE — PROGRESS NOTES
[facility]OSP - [facility]    Progress Note    Jamilah Newellmary Patient Status:  Inpatient    10/20/1945 MRN [id number]   Location [address] UNIT Attending Mary Billy MD   Hosp Day # 1 PCP Marciano Jackson MD ALKPHO 229 (H) 08/25/2020    BILT 0.3 08/25/2020    TP 6.1 (L) 08/25/2020    AST 20 08/25/2020    ALT 30 08/25/2020    INR 1.17 08/21/2020    ESRML 84 (H) 08/21/2020    CRP 1.78 (H) 08/21/2020    MG 1.7 07/29/2020    PHOS 3.2 07/28/2020    TROP <0.045 0

## 2020-08-25 NOTE — CM/SW NOTE
SW received MDO for Surgery    SW met with pt at bedside to complete initial assessment and discuss DC planning. Pt is familiar to SW due to previous admission. Pt states he had an awful experience at [facility] and does not want to return.  Pt believes his

## 2020-08-25 NOTE — PROGRESS NOTES
ORTHO    Doing well this morning     08/25/20  0337   BP: 116/57   Pulse: 77   Resp: 18   Temp: 98 °F (36.7 °C)     Dressing intact R hip  RLE NVID  No CT  < 2 sec cap refill to toes    Plan: S/P I&D R hip    1. WBAT, PT  2.  Will change dressing tomorrow

## 2020-08-26 PROCEDURE — 99233 SBSQ HOSP IP/OBS HIGH 50: CPT | Performed by: HOSPITALIST

## 2020-08-26 RX ORDER — CYCLOBENZAPRINE HCL 5 MG
5 TABLET ORAL 3 TIMES DAILY PRN
Status: DISCONTINUED | OUTPATIENT
Start: 2020-08-26 | End: 2020-08-28

## 2020-08-26 RX ORDER — VANCOMYCIN HYDROCHLORIDE
1250 EVERY 12 HOURS
Status: DISCONTINUED | OUTPATIENT
Start: 2020-08-27 | End: 2020-08-28

## 2020-08-26 NOTE — RESPIRATORY THERAPY NOTE
LELO ASSESSMENT:    Pt does not have a previous diagnosis of LELO. Pt does not routinely use a CPAP device at home. This patient declined information packet for LELO.

## 2020-08-26 NOTE — OCCUPATIONAL THERAPY NOTE
Pt not seen for OT at this time secondary to pt refusing session, with c/o sore R LE. Nursing aware.    Will attempt to see pt next date as pt is able to participate

## 2020-08-26 NOTE — PROGRESS NOTES
ORTHO    S: Patient feels well with the hip.   The bed sore is more painful.     08/25/20 2045   BP: 117/59   Pulse: 96   Resp: 18   Temp: 97.7 °F (36.5 °C)       PE: Dressing is intact, NVID RLE    Labs:  Lab Results   Component Value Date    WBC 7.4 08/2

## 2020-08-26 NOTE — PROGRESS NOTES
[place] [facility] - [facility] ID PROGRESS NOTE    Jae Dues Patient Status:  Inpatient    10/20/1945 MRN [id number]   Location [facility] 4W/SW/SE Attending Trav Jo MD   Hosp Day # 2 PCP Keshia West MD     [facility] was noted to be a deep sinus down to the level of the deep fascia without perforation of the fascia. No fluid was able to obtain deep to the fascia. Has been afebrile with T-max 99.2. WBC normal.  Given vancomycin and cefazolin perioperatively.   All cul

## 2020-08-26 NOTE — PROGRESS NOTES
[facility]    Progress Note    Ilean Masker Patient Status:  Inpatient    10/20/1945 MRN [id number]   Location [facility] 4W/SW/SE Attending Jewel Hernandez MD   Hosp Day # 2 PCP Ryan Blackburn MD       Subjective: vancomycin  15 mg/kg (Adjusted) Intravenous Q12H   • allopurinol  300 mg Oral Daily   • atorvastatin  40 mg Oral Daily   • hydrochlorothiazide  12.5 mg Oral Daily   • lisinopril  20 mg Oral Daily   • Metoprolol Succinate ER  25 mg Oral Daily   • Pantoprazo TP 7.4 6.1*  --      Lab Results   Component Value Date    INR 1.17 08/21/2020       Culture:  Hospital Encounter on 08/24/20   1.  BODY FLUID CULT,AEROBIC AND ANAEROBIC     Status: Abnormal (Preliminary result)    Collection Time: 08/24/20  2:59 PM   Res

## 2020-08-26 NOTE — PROGRESS NOTES
[facility] dosing service    Follow-up Pharmacokinetic Consult for Vancomycin Dosing     Trudi Jane is a 69year old patient who is being treated for MRSA infection .    Patient is on day 3 of Vancomycin and is currently receiving 1.75 gm I patient.     Rahul MeadeD  8/26/2020  4:00 PM  [address] Extension: [phone]

## 2020-08-26 NOTE — PLAN OF CARE
Problem: Patient Centered Care  Goal: Patient preferences are identified and integrated in the patient's plan of care  Description  Interventions:  - What would you like us to know as we care for you?  I'm a retired /   - Provide ti needed.   - See additional Care Plan goals for specific interventions   Outcome: Progressing     Problem: SKIN/TISSUE INTEGRITY - ADULT  Goal: Incision(s), wounds(s) or drain site(s) healing without S/S of infection  Description  INTERVENTIONS:  - Assess an management  - Manage/alleviate anxiety  - Utilize distraction and/or relaxation techniques  - Monitor for opioid side effects  - Notify MD/LIP if interventions unsuccessful or patient reports new pain  - Anticipate increased pain with activity and pre-medi 5th fingers. Medicated with Norco prn. Overlay mattress in place. Dressing to wound on left buttock done per order. Accucheck ac & hs. Plan to go home with [facility] when discharged.

## 2020-08-26 NOTE — DIETARY NOTE
ADULT NUTRITION INITIAL ASSESSMENT    Pt is at moderate nutrition risk. Pt does not meet malnutrition criteria.       RECOMMENDATIONS TO MD:  See Nutrition Intervention     NUTRITION DIAGNOSIS/PROBLEM:  Increased nutrient needs related to increased deman kg/m².--actually 41.1 when adjusted for L BKA  BMI CLASSIFICATION: greater than 40 kg/m2 - morbid obesity class III  IBW: 190 lbs adjusted for BKA        168% IBW  Usual Body Wt: hx of >400# with intentional loss to ~300#, has regained some.     WEIGHT HIST Ideal body wt (IBW))    MONITOR AND EVALUATE/NUTRITION GOALS:  - Food and Nutrient Intake:      Monitor: adequacy of PO intake and monitor need for supplement.   .    - Anthropometric Measurement:      Monitor: wt     - Nutrition Goals:      long term gradu

## 2020-08-26 NOTE — PHYSICAL THERAPY NOTE
Therapist attempt to see pt 2x, pt declined to participate with PT session due increased pain in R hip area. Pt requested for ice packs to his R hip area and stay in bed today. RN aware of pt progress. Pt stated he likes to be seen tomorrow at am session.

## 2020-08-26 NOTE — CM/SW NOTE
MAUREEN followed up on the DC plan and possible IV Abx.      Freya Quezada and Karmen DIAZ who states arrangements are hoping to be made for patient to receive a once a week dose of IV abx which does not require a PICC line and pt could go outpatient to

## 2020-08-27 PROCEDURE — 99233 SBSQ HOSP IP/OBS HIGH 50: CPT | Performed by: HOSPITALIST

## 2020-08-27 RX ORDER — PSEUDOEPHEDRINE HCL 30 MG
100 TABLET ORAL 2 TIMES DAILY
Refills: 0 | Status: SHIPPED | COMMUNITY
Start: 2020-08-27

## 2020-08-27 RX ORDER — POLYETHYLENE GLYCOL 3350 17 G/17G
17 POWDER, FOR SOLUTION ORAL DAILY PRN
Refills: 0 | Status: SHIPPED | COMMUNITY
Start: 2020-08-27

## 2020-08-27 NOTE — PHYSICAL THERAPY NOTE
PHYSICAL THERAPY TREATMENT NOTE - INPATIENT     Room Number: 423/423-A       Presenting Problem: R hip I&D due to infection (L THR 7/20)    Problem List  Principal Problem:    Surgical wound infection  Active Problems:    Essential hypertension    Morbid o Plan: Endurance;Gait training;Transfer training;Balance training    SUBJECTIVE  I walk but I do my way     OBJECTIVE  Precautions: ORLANDO - posterior;Limb alert - right    WEIGHT BEARING RESTRICTION  Weight Bearing Restriction: R lower extremity        Ivette Spatz concerns addressed;RN aware of session/findings;Call light within reach; Needs met    CURRENT GOALS     Goals to be met by: 8/31/20  Patient Goal Patient's self-stated goal is: to go home   Goal #1 Patient is able to demonstrate supine - sit EOB @ level: zhao

## 2020-08-27 NOTE — PROGRESS NOTES
ORTHO    Patient doing okay today. Still moving slowly. 08/27/20  1358   BP: 137/67   Pulse: 99   Resp: 18   Temp: 99 °F (37.2 °C)     RLE NVID  Wound vac in place    Plan: S/P I&D R hip wound  1. IV abx  2. Wound vac dressing until next Monday  3.  PT

## 2020-08-27 NOTE — PROGRESS NOTES
[place] [facility] - [facility] PROGRESS NOTE    Sunita Wynne Patient Status:  Inpatient    10/20/1945 MRN [id number]   Location [facility] 4W/SW/SE Attending Shellie Jones MD   Hosp Day # 3 PCP MD Blu Wright persistent drainage and taken back to the OR on 8/24 for excisional irrigation debridement of the right hip wound down to the skin and subcutaneous tissue.   Intra-Op there was noted to be a deep sinus down to the level of the deep fascia without perforatio

## 2020-08-27 NOTE — OCCUPATIONAL THERAPY NOTE
OCCUPATIONAL THERAPY TREATMENT NOTE - INPATIENT    Room Number: 423/423-A               Problem List  Principal Problem:    Surgical wound infection  Active Problems:    Essential hypertension    Morbid obesity with BMI of 40.0-44.9, adult ([facility]    Hyperli posterior;Limb alert - right    WEIGHT BEARING RESTRICTION  Weight Bearing Restriction: R lower extremity        R Lower Extremity: Weight Bearing as Tolerated       PAIN ASSESSMENT  Rating: Unable to rate  Location: (R hip in standing)  Management [employer] precautions with cues    Patient will complete toilet transfer with SBA   Comment: pt declined need. recommending raised seat    Patient will complete self care task at sink level with SBA    Comment: pt tolerated standing with CGA                  Goals Ex

## 2020-08-27 NOTE — CM/SW NOTE
10: 29AM   MAUREEN sent clinicals and updates to [facility] via fax. HH orders and F2F were attached. Pending DC and final IV Abx information- patient would be going to [facility] for IV Abx once a week. MAUREEN to inform [place] and [facility] at discharge.

## 2020-08-27 NOTE — PROGRESS NOTES
[facility]    Progress Note    Carlos Kahn Patient Status:  Inpatient    10/20/1945 MRN [id number]   Location [facility] 4W/SW/SE Attending Paula Corbett MD   Hosp Day # 3 PCP Lenin Coppola MD       Subjective: atorvastatin  40 mg Oral Daily   • hydrochlorothiazide  12.5 mg Oral Daily   • lisinopril  20 mg Oral Daily   • Metoprolol Succinate ER  25 mg Oral Daily   • Pantoprazole Sodium  40 mg Oral QAM AC   • pregabalin  150 mg Oral Nightly       Current PRN Inpat Component Value Date    INR 1.17 08/21/2020       Culture:  Hospital Encounter on 08/24/20   1.  BODY FLUID CULT,AEROBIC AND ANAEROBIC     Status: Abnormal (Preliminary result)    Collection Time: 08/24/20  2:59 PM   Result Value Ref Range    Body Fluid C workup  Otis Ga MD

## 2020-08-27 NOTE — PLAN OF CARE
Plan to DC tomorrow. Gigi cleared. Needs MD to [facility]. ID decided on abx. First dose Saturday and it will be 1X weekly. Wife and pt know plan. Hopes to DC by noon. PT/OT cleared r/t ability to stand better from home chair.   Up from our recliner X2 assistance  - Assess pain using appropriate pain scale  - Administer analgesics based on type and severity of pain and evaluate response  - Implement non-pharmacological measures as appropriate and evaluate response  - Consider cultural and social influenc services based on physician/LIP order or complex needs related to functional status, cognitive ability or social support system  Outcome: Progressing

## 2020-08-28 VITALS
HEIGHT: 76 IN | OXYGEN SATURATION: 98 % | SYSTOLIC BLOOD PRESSURE: 143 MMHG | TEMPERATURE: 98 F | DIASTOLIC BLOOD PRESSURE: 57 MMHG | BODY MASS INDEX: 38.36 KG/M2 | RESPIRATION RATE: 17 BRPM | HEART RATE: 85 BPM | WEIGHT: 315 LBS

## 2020-08-28 PROCEDURE — 99233 SBSQ HOSP IP/OBS HIGH 50: CPT | Performed by: HOSPITALIST

## 2020-08-28 RX ORDER — HYDROCODONE BITARTRATE AND ACETAMINOPHEN 10; 325 MG/1; MG/1
1 TABLET ORAL EVERY 4 HOURS PRN
Qty: 40 TABLET | Refills: 0 | Status: SHIPPED | OUTPATIENT
Start: 2020-08-28

## 2020-08-28 RX ORDER — CYCLOBENZAPRINE HCL 10 MG
10 TABLET ORAL EVERY 8 HOURS PRN
Qty: 30 TABLET | Refills: 0 | Status: SHIPPED | OUTPATIENT
Start: 2020-08-28

## 2020-08-28 NOTE — PROGRESS NOTES
120 [facility] dosing service    Follow-up Pharmacokinetic Consult for Vancomycin Dosing     Calixto Lanza is a 69year old patient who is being treated for MRSA infection . Patient is currently receiving 1.25 gm IV Q 12 hours.   Goal trough is care of this patient.     Destiney Mcfarland, PharmD  8/28/2020  8:26 AM  [facility] Extension: [phone]

## 2020-08-28 NOTE — PROGRESS NOTES
[facility]    Progress Note    Melonie Estes Patient Status:  Inpatient    10/20/1945 MRN [id number]   Location [facility] 4W/SW/SE Attending Bill Thomas MD   Hosp Day # 4 PCP Maricel Gallegos MD       Subjective: 40 mg Oral Daily   • hydrochlorothiazide  12.5 mg Oral Daily   • lisinopril  20 mg Oral Daily   • Metoprolol Succinate ER  25 mg Oral Daily   • Pantoprazole Sodium  40 [facility]   • pregabalin  150 mg Oral Nightly       Current PRN Inpatient Meds: Value Date    INR 1.17 08/21/2020       Culture:  Hospital Encounter on 08/24/20   1.  BODY FLUID CULT,AEROBIC AND ANAEROBIC     Status: Abnormal (Preliminary result)    Collection Time: 08/24/20  2:59 PM   Result Value Ref Range    Body Fluid Culture Resul for outpatient wound care clinic and IV antibiotics    On 08/28/2020 I had a face to face encounter with Kely Crowell for a semi electric  hospital bed medical necessity evaluation.  Patient has a history of morbid obesity, obstructive sleep apnea, hype

## 2020-08-28 NOTE — PLAN OF CARE
Problem: Patient Centered Care  Goal: Patient preferences are identified and integrated in the patient's plan of care  Description  Interventions:  - What would you like us to know as we care for you?  I'm a retired /   - Provide ti needed.   - See additional Care Plan goals for specific interventions   Outcome: Progressing     Problem: SKIN/TISSUE INTEGRITY - ADULT  Goal: Incision(s), wounds(s) or drain site(s) healing without S/S of infection  Description  INTERVENTIONS:  - Assess an response  - Consider cultural and social influences on pain and pain management  - Manage/alleviate anxiety  - Utilize distraction and/or relaxation techniques  - Monitor for opioid side effects  - Notify MD/LIP if interventions unsuccessful or patient rep

## 2020-08-28 NOTE — CM/SW NOTE
CM confirmed w/[facility]Cheryl[facility] [phone] of infusion appointment time 8/29/20-10:30am.  Pt scheduled for Dalbavancin x2 doses and pt will f/u with Dr. Loli Kearns upon DC. Notified David Christensen pt is being discharged today.     Per David Christensen [facility] request sent via

## 2020-08-28 NOTE — CM/SW NOTE
SW received call from RN who states pt does not want [address] and would like to go outpatient for wound care and therapy.     -------    MD will need to document the following in note, please copy & paste, fill in parentheses:     On (00/00/0000) I had a face t from [facility] wound care who states they received the referral and will call the patient on Monday to schedule their appointments. If pt's insurance does not cover they will discuss with the patient and family on the next steps.      Pt and wife are aware that

## 2020-08-28 NOTE — CM/SW NOTE
08/28/20 1200   Readmission Assessment   Factors that patient feels contributed to this readmission Other (only choose if nothing else applies)  (pt states \"Annette caused my infection\")   Pt. received education on diagnoses at time of discharge?  Yes

## 2020-08-28 NOTE — PLAN OF CARE
Pt is aox4, POD 4 s/p right hip I+D. ambulating with walker and 1-2 assist. Voiding freely per urinal. Asa bid with letitia to RLE for DVT prophylaxis. LLE below the knee amputation, prosthesis at bedside.  Dressing to right hip is prevena wound vac CDI, dressi Goals  Goal: Patient/Family Long Term Goal  Description  Patient's Long Term Goal: To walk again with a walker and my prosthesis    Interventions:  - Hip precautions as instructed.  - PT/OT at home for gait, balance and transfers.  - Encourage movement and level and limitations with patient/family  Outcome: Progressing     Problem: Impaired Functional Mobility  Goal: Achieve highest/safest level of mobility/gait  Description  Interventions:  - Assess patient's functional ability and stability  - Promote incr resources  Description  INTERVENTIONS:  - Identify barriers to discharge w/pt and caregiver  - Include patient/family/discharge partner in discharge planning  - Arrange for needed discharge resources and transportation as appropriate  - Identify discharge

## 2020-08-28 NOTE — DISCHARGE SUMMARY
[facility]    Discharge Summary    Hazel Arana Patient Status:  Inpatient    10/20/1945 MRN [id number]   Location [facility] 4W/SW/SE Attending No att. providers found   Hosp Day # 4 PCP Genie Rordiguez MD     Date of A ([address]  - counseled on diet and exercise     Suspected LELO  - needs outpt sleep study     Hyperlipidemia  - cont home meds     Gout  - cont home meds     Deconditioning  - Pt would like to follow up with out patient PT  - refusing [facility] PT/OT     Left buttock 8. 0* 8.4*   HCT 24.2* 24.7* 26.0*   MCV 98.4 98.0 97.0   MCH 31.3 31.7 31.3   MCHC 31.8 32.4 32.3   RDW 15.7* 15.8* 15.8*   NEPRELIM 4.54 4.65 4.16   WBC 7.4 7.5 7.3   .0 272.0 260.0     Recent Labs   Lab 08/25/20  0555 08/26/20  0603 08/27/20  0553 cyclobenzaprine 10 MG Tabs  Commonly known as:  FLEXERIL      Take 1 tablet (10 mg total) by mouth every 8 (eight) hours as needed.    Quantity:  30 tablet  Refills:  0     dalbavancin HCl 500 MG Solr  Commonly known as:  DALVANCE      Inject 1,000 mg int [id number]   · collagenase 250 UNIT/GM Oint     Please  your prescriptions at the location directed by your doctor or nurse    Bring a paper prescription for each of these medications  · cyclobenzaprine 10 MG Tabs  · HYDROcodone-acetaminophen 10- office if there are any problems with nausea, vomiting, constipation or diarrhea. ?A stool softner has been ordered and should be taken regularly while on pain medications. Wound Care on Pressure Ulcer:  1. Apply santyl to wound bed ONLY  2.  Wet gauze

## 2020-08-28 NOTE — PLAN OF CARE
Problem: Patient Centered Care  Goal: Patient preferences are identified and integrated in the patient's plan of care  Description  Interventions:  - What would you like us to know as we care for you?  I'm a retired /   - Provide ti to transfer, walk safe enough for home discharge. Interventions:   - Prevena wound vac to right hip  - 1st step overlay mattress to prevent further skin issues  - Wound consult  - Turn q2h and prn for comfort.  - Pain management with oral medication.   - patient's functional ability and stability  - Promote increasing activity/tolerance for mobility and gait  - Educate and engage patient/family in tolerated activity level and precautions    8/28/2020 1353 by Selvin Gonzalez RN  Outcome: Adequate for Dischar PLANNING  Goal: Discharge to home or other facility with appropriate resources  Description  INTERVENTIONS:  - Identify barriers to discharge w/pt and caregiver  - Include patient/family/discharge partner in discharge planning  - Arrange for needed dischar

## 2020-08-29 NOTE — CM/SW NOTE
SW received a call earlier today from pt's wife. She said that in addition to the gel mattress overlay that pt also needs an alternating pressure air mattress. MAUREEN explained that MD did not order this and pt is already d/c.   Encouraged wife to communica

## 2020-08-29 NOTE — CM/SW NOTE
MAUREEN follow-up. SW received completed MD form for pt's gel mattress overlay. MAUREEN received call from [address] ([phone]). MAUREEN sent him the completed form via email to [email or web address].     Stefan Gamino, [address]

## 2020-08-31 ENCOUNTER — TELEPHONE (OUTPATIENT)
Dept: INTERNAL MEDICINE UNIT | Facility: HOSPITAL | Age: 75
End: 2020-08-31

## 2020-08-31 RX ORDER — MELATONIN
325
Qty: 30 TABLET | Refills: 0 | OUTPATIENT
Start: 2020-08-31

## 2020-08-31 NOTE — TELEPHONE ENCOUNTER
HOSPITALIST NURSE   TELEPHONE NOTE    Refill request received by the Hospitalist office. Hospitalist team unable to approve refills.    Message left for patient instructing him to contact PCP for refill requests

## 2020-08-31 NOTE — TELEPHONE ENCOUNTER
HOSPITALIST NURSE   TELEPHONE NOTE    Prior Auth request received by the Hospitalist office. Hospitalist team unable to complete prior auths. Pharmacist notified to route request to PCP for further assistance.

## 2022-02-23 NOTE — PROGRESS NOTES
[place] [facility]    Klarissa Wang Patient Status:  Inpatient    10/20/1945 MRN [id number]   Location [facility] 4W/SW/SE Attending Bijal Sheikh MD   Hosp Day # 4 PCP MD Irais Avila and subcutaneous tissue. Intra-Op there was noted to be a deep sinus down to the level of the deep fascia without perforation of the fascia. No fluid was able to obtain deep to the fascia. Has been afebrile with T-max 99.2.   WBC normal.  Given vancomyci Quality 111:Pneumonia Vaccination Status For Older Adults: Pneumococcal Vaccination not Administered or Previously Received, Reason not Otherwise Specified Quality 110: Preventive Care And Screening: Influenza Immunization: Influenza immunization was not ordered or administered, reason not given Quality 474: Zoster Vaccination Status: Shingrix Vaccination not Administered or Previously Received, Reason not Otherwise Specified Detail Level: Detailed Quality 130: Documentation Of Current Medications In The Medical Record: Current Medications Documented Quality 431: Preventive Care And Screening: Unhealthy Alcohol Use - Screening: Patient screened for unhealthy alcohol use using a single question and scores less than 2 times per year Quality 226: Preventive Care And Screening: Tobacco Use: Screening And Cessation Intervention: Patient screened for tobacco use and is an ex/non-smoker

## (undated) DEVICE — NON-ADHERENT PADS PREPACK: Brand: TELFA

## (undated) DEVICE — SOLUTION SURG DURA PREP HAZMAT

## (undated) DEVICE — ENCORE® LATEX MICRO SIZE 8.5, STERILE LATEX POWDER-FREE SURGICAL GLOVE: Brand: ENCORE

## (undated) DEVICE — ENCORE® LATEX MICRO SIZE 6.5, STERILE LATEX POWDER-FREE SURGICAL GLOVE: Brand: ENCORE

## (undated) DEVICE — SUTURE ETHILON 2-0 460T

## (undated) DEVICE — COVER SGL STRL LGHT HNDL BLU

## (undated) DEVICE — PREVENA PEEL & PLACE SYSTEM KIT- 13 CM: Brand: PREVENA™ PEEL & PLACE™

## (undated) DEVICE — COTTON UNDERCAST PADDING,REGULAR FINISH: Brand: WEBRIL

## (undated) DEVICE — CONTAINER SPEC STR 4OZ GRY LID

## (undated) DEVICE — PEN: MARKING STD PT 100/CS: Brand: MEDICAL ACTION INDUSTRIES

## (undated) DEVICE — GAMMEX® NON-LATEX PI ORTHO SIZE 8.5, STERILE POLYISOPRENE POWDER-FREE SURGICAL GLOVE: Brand: GAMMEX

## (undated) DEVICE — ZIMMER® STERILE DISPOSABLE TOURNIQUET CUFF WITH PLC, DUAL PORT, SINGLE BLADDER, 42 IN. (107 CM)

## (undated) DEVICE — 3M™ TEGADERM™ TRANSPARENT FILM DRESSING, 1626W, 4 IN X 4-3/4 IN (10 CM X 12 CM), 50 EACH/CARTON, 4 CARTON/CASE: Brand: 3M™ TEGADERM™

## (undated) DEVICE — HOOD: Brand: FLYTE

## (undated) DEVICE — OCCLUSIVE GAUZE STRIP,3% BISMUTH TRIBROMOPHENATE IN PETROLATUM BLEND: Brand: XEROFORM

## (undated) DEVICE — BLADE ELECTRODE: Brand: EDGE

## (undated) DEVICE — BIT DRL 30MM 3.2MM RNGLC ACTB

## (undated) DEVICE — GAMMEX® NON-LATEX PI ORTHO SIZE 6.5, STERILE POLYISOPRENE POWDER-FREE SURGICAL GLOVE: Brand: GAMMEX

## (undated) DEVICE — SOL  .9 1000ML BTL

## (undated) DEVICE — PACK CDS TOTAL HIP

## (undated) DEVICE — GAMMEX® PI HYBRID SIZE 8.5, STERILE POWDER-FREE SURGICAL GLOVE, POLYISOPRENE AND NEOPRENE BLEND: Brand: GAMMEX

## (undated) DEVICE — BATTERY

## (undated) DEVICE — TRAY SKIN PREP PVP-1

## (undated) DEVICE — HOOD, PEEL-AWAY: Brand: FLYTE

## (undated) DEVICE — 2T11 #2 PDO 36 X 36: Brand: 2T11 #2 PDO 36 X 36

## (undated) DEVICE — LOWER EXTREMITY: Brand: MEDLINE INDUSTRIES, INC.

## (undated) DEVICE — KIT DRN 1/8IN PVC 3 SPRG EVAC

## (undated) DEVICE — Device: Brand: JELCO

## (undated) DEVICE — SPONGE LAP 18X18 XRAY STRL

## (undated) DEVICE — 3M(TM) TEGADERM(TM) TRANSPARENT FILM DRESSING FRAME STYLE 1628: Brand: 3M™ TEGADERM™

## (undated) DEVICE — DRAPE SRG U 124X80IN U REINF

## (undated) DEVICE — TOWEL OR BLU 16X26 STRL

## (undated) DEVICE — CULTURE COLLECT/TRANSPORT SYS

## (undated) DEVICE — 20 ML SYRINGE LUER-LOCK TIP: Brand: MONOJECT

## (undated) DEVICE — Device: Brand: STABLECUT®

## (undated) DEVICE — SUTURE ETHILON 3-0 669H

## (undated) DEVICE — 3M™ IOBAN™ 2 ANTIMICROBIAL INCISE DRAPE 6650EZ: Brand: IOBAN™ 2

## (undated) DEVICE — GAUZE SPONGES,12 PLY: Brand: CURITY

## (undated) DEVICE — SUTURE PDS II 1 CT-1

## (undated) DEVICE — SUTURE MONOCRYL 2-0 Y945H

## (undated) DEVICE — SUTURE ETHILON 2-0 FS

## (undated) DEVICE — SHEET,DRAPE,70X100,STERILE: Brand: MEDLINE

## (undated) DEVICE — SOL  .9 3000ML

## (undated) DEVICE — ADH DRMBND PRINEO SKN CLOS TOP

## (undated) DEVICE — CULTURE TUBE ANAEROBIC

## (undated) DEVICE — PROXIMATE RH ROTATING HEAD SKIN STAPLERS (35 WIDE) CONTAINS 35 STAINLESS STEEL STAPLES: Brand: PROXIMATE

## (undated) NOTE — IP AVS SNAPSHOT
[facility]            (For Outpatient Use Only) Initial Admit Date: 7/20/2020   Inpt/Obs Admit Date: Inpt: 7/20/20 / Obs: N/A   Discharge Date:    Vasiliy Weinstein:  [de-identified]   MRN: [de-identified]   CSN: [id number]   CEID: [id number]        [facility] Subscriber Name:  Abdelrahman Bermudez :    Subscriber ID:  Pt Rel to Subscriber:    Hospital Account Financial Class: Medicare Advantage    2020

## (undated) NOTE — IP AVS SNAPSHOT
Patient Demographics     Address  [address] [place] [id number] Phone  [phone] (Home)  [phone] [facility] *Preferred* E-mail Address  [email or web address]      Emergency Contact(s)     Name Relation Home Work Mobile    john barbosa Give 3 units for blood glucose 181-200 mg/dL  Give 4 units for blood glucose 201-220 mg/dL  Give 5 units for blood glucose 221-240 mg/dL  Give 6 units for blood glucose 241-260 mg/dL  Give 7 units for blood glucose 261-280 mg/dL  Give 8 units for blood glu HYDROcodone-acetaminophen  MG Tabs  Commonly known as:  Marnie Session  Next dose due:  As needed for pain  Last given at 12:48 PM  Next dose at 4:48 PM      Take 2 tablets by mouth every 4 (four) hours as needed.    Ivan Fowler MD         HYDROcodone-acet Next dose due:  TOMORROW MORNING 7/31      Take 1 tablet by mouth daily.                 Where to Get Your Medications      Please  your prescriptions at the location directed by your doctor or nurse    Bring a paper prescription for each of these me [id number] cephALEXin [facility]) cap 500 mg 07/30/20 1248 Given      [id number] docusate sodium (COLACE) cap 100 mg 07/29/20 2121 Given      [id number] docusate sodium (COLACE) cap 100 mg 07/30/20 1002 Given      [id number] famoTIDine (PEPCID) tab 20 mg ([facility] [place] [facility] Lymphocyte % Manual 17 % — [facility] ([facility])   Monocyte % Manual 6 % — [facility] ([facility])   Eosinophil % Manual 6 % — [facility] ([facility])   Basophil % Manual 2 % — [facility] ([facility])   Myelocyte % 2 % — [facility] ([facility])   NRBC 1 0 H [facility]) ---------                               -----------         ------                     CBC W/ DIFFERENTIAL[[id number]]          Abnormal            Final result                 Please view results for these tests on the individual orders.     Spe have been medically clear and would like to proceed      *[DV.1]hydrochlorothiazide 12.5 MG Oral Tab, Take 12.5 mg by mouth daily. , Disp: , Rfl:   atorvastatin 40 MG Oral Tab, Take 40 mg by mouth nightly., Disp: , Rfl:   Pioglitazone HCl 45 MG Oral Tab, Ta ceFAZolin in NS (ANCEF) 3g/100mL IVPB premix, 3 g, Intravenous, Once[DV.2]        Allergies:[DV.1] Patient has no known allergies.     Past Medical History:   Diagnosis Date   • Back problem    • BPH (benign prostatic hyperplasia)    • Calculus of kidney Consults signed by Chanel Hernandez MD at 7/25/2020  6:19 PM     Author:  Chanel Hernandez MD Service:  Cardiology Author Type:  Physician    Filed:  7/25/2020  6:19 PM Date of Service:  7/25/2020 10:44 AM Status:  Addendum    :  Chanel Hernandez MD (Physician) Drug use: Never        Review of Systems:     Respiratory: Negative for[SL.1] shortness of breath[SL.2]. Cardiovascular:[SL.1] Negative[SL.2].         Physical Exam:   Vital Signs:   height is 6' 4\" (1.93 m) and weight is 358 lb 4.8 oz (162.5 kg) (abn -S/P R HIP TOTAL ARTHROPLASTY[SL.1] POD #4[SL.2]  -tx per ortho        Essential hypertension  -b/p variable  -continue bb, home meds acei and hctz on hold 2/2 cr- resume when able  -can use prn IV hydral if b/p >160[SL. 1]     Arrhythmia - ddx includes NSV being switched to oral medications. Will attempt to see patient later as time permits. RN aware and agreeable. [JG.1]      Attribution Key    JG. 1 - Chacorta Aviles, PT on 7/29/2020  3:11 PM               Physical Therapy Note signed by Cydney Ortiz the physical skills to return to prior living environment upon D/C from the hospital. Anticipate patient will benefit from continued skilled physical therapy while in the hospital and upon D/C from the hospital at an acute rehab facility.  The patient's Shikha Dynamic Standing: Poor +    AM-PAC '6-Clicks' INPATIENT SHORT FORM - BASIC MOBILITY  How much difficulty does the patient currently have. ..  -   Turning over in bed (including adjusting bedclothes, sheets and blankets)?: A Lot   -   Sitting down on and sta Goal #6 Patient independently performs home exercise program for ROM/strengthening per the instructions provided in preparation for discharge. Goal #6  Current Status In progress[JG.1]            Attribution Key    JG. 1 - Neto Martinez, PT on 7/28/2 would benefit from more access to equipment such as parallel bars, STS machines to accommodate his size; he is having difficulty with standing from his bed as it does not provide a great deal of stability to get adequate push off; believe that he would thr BALANCE ASSESSMENT  Static Sitting: Fair+  Dynamic Sitting: Fair-    FUNCTIONAL ADL ASSESSMENT  Grooming: Setup  Toileting: Max A   Upper Body Dressing: Min A   Lower Body Dressing: Max A     Education Provided: Hip precautions, rationale for tasks[AA. 1] - Posterior hip precaution. Maintain hip abductor splint when sleeping at night.   - Monitor incision for any signs of infection.  - Pain management with oral medication.  - See additional Care Plan goals for specific interventions